# Patient Record
Sex: FEMALE | Race: WHITE | HISPANIC OR LATINO | Employment: FULL TIME | URBAN - METROPOLITAN AREA
[De-identification: names, ages, dates, MRNs, and addresses within clinical notes are randomized per-mention and may not be internally consistent; named-entity substitution may affect disease eponyms.]

---

## 2019-01-04 ENCOUNTER — APPOINTMENT (OUTPATIENT)
Dept: CT IMAGING | Facility: CLINIC | Age: 29
End: 2019-01-04
Payer: COMMERCIAL

## 2019-01-04 ENCOUNTER — HOSPITAL ENCOUNTER (OUTPATIENT)
Facility: CLINIC | Age: 29
Setting detail: OBSERVATION
Discharge: HOME | End: 2019-01-05
Attending: EMERGENCY MEDICINE | Admitting: SURGERY
Payer: COMMERCIAL

## 2019-01-04 DIAGNOSIS — R10.31 RLQ ABDOMINAL PAIN: ICD-10-CM

## 2019-01-04 DIAGNOSIS — K35.80 ACUTE APPENDICITIS, UNSPECIFIED ACUTE APPENDICITIS TYPE: ICD-10-CM

## 2019-01-04 DIAGNOSIS — K35.30 ACUTE APPENDICITIS WITH LOCALIZED PERITONITIS, WITHOUT PERFORATION, ABSCESS, OR GANGRENE: Primary | ICD-10-CM

## 2019-01-04 LAB
ALBUMIN SERPL-MCNC: 3.7 G/DL (ref 3.4–5)
ALBUMIN UR-MCNC: NEGATIVE MG/DL
ALP SERPL-CCNC: 136 U/L (ref 40–150)
ALT SERPL W P-5'-P-CCNC: 22 U/L (ref 0–50)
ANION GAP SERPL CALCULATED.3IONS-SCNC: 8 MMOL/L (ref 3–14)
APPEARANCE UR: CLEAR
AST SERPL W P-5'-P-CCNC: 22 U/L (ref 0–45)
BASOPHILS # BLD AUTO: 0 10E9/L (ref 0–0.2)
BASOPHILS NFR BLD AUTO: 0.1 %
BILIRUB SERPL-MCNC: 0.4 MG/DL (ref 0.2–1.3)
BILIRUB UR QL STRIP: NEGATIVE
BUN SERPL-MCNC: 12 MG/DL (ref 7–30)
CALCIUM SERPL-MCNC: 8.4 MG/DL (ref 8.5–10.1)
CHLORIDE SERPL-SCNC: 105 MMOL/L (ref 94–109)
CO2 SERPL-SCNC: 26 MMOL/L (ref 20–32)
COLOR UR AUTO: YELLOW
CREAT SERPL-MCNC: 0.62 MG/DL (ref 0.52–1.04)
DIFFERENTIAL METHOD BLD: ABNORMAL
EOSINOPHIL # BLD AUTO: 0.1 10E9/L (ref 0–0.7)
EOSINOPHIL NFR BLD AUTO: 0.3 %
ERYTHROCYTE [DISTWIDTH] IN BLOOD BY AUTOMATED COUNT: 12.7 % (ref 10–15)
GFR SERPL CREATININE-BSD FRML MDRD: >90 ML/MIN/{1.73_M2}
GLUCOSE SERPL-MCNC: 123 MG/DL (ref 70–99)
GLUCOSE UR STRIP-MCNC: NEGATIVE MG/DL
HCG UR QL: NEGATIVE
HCT VFR BLD AUTO: 37.2 % (ref 35–47)
HGB BLD-MCNC: 12.6 G/DL (ref 11.7–15.7)
HGB UR QL STRIP: ABNORMAL
IMM GRANULOCYTES # BLD: 0 10E9/L (ref 0–0.4)
IMM GRANULOCYTES NFR BLD: 0.2 %
KETONES UR STRIP-MCNC: 40 MG/DL
LEUKOCYTE ESTERASE UR QL STRIP: NEGATIVE
LIPASE SERPL-CCNC: 73 U/L (ref 73–393)
LYMPHOCYTES # BLD AUTO: 1 10E9/L (ref 0.8–5.3)
LYMPHOCYTES NFR BLD AUTO: 7.2 %
MCH RBC QN AUTO: 30.4 PG (ref 26.5–33)
MCHC RBC AUTO-ENTMCNC: 33.9 G/DL (ref 31.5–36.5)
MCV RBC AUTO: 90 FL (ref 78–100)
MONOCYTES # BLD AUTO: 0.7 10E9/L (ref 0–1.3)
MONOCYTES NFR BLD AUTO: 4.5 %
MUCOUS THREADS #/AREA URNS LPF: PRESENT /LPF
NEUTROPHILS # BLD AUTO: 12.7 10E9/L (ref 1.6–8.3)
NEUTROPHILS NFR BLD AUTO: 87.7 %
NITRATE UR QL: NEGATIVE
NRBC # BLD AUTO: 0 10*3/UL
NRBC BLD AUTO-RTO: 0 /100
PH UR STRIP: 5.5 PH (ref 5–7)
PLATELET # BLD AUTO: 253 10E9/L (ref 150–450)
POTASSIUM SERPL-SCNC: 3.4 MMOL/L (ref 3.4–5.3)
PROT SERPL-MCNC: 7.8 G/DL (ref 6.8–8.8)
RBC # BLD AUTO: 4.15 10E12/L (ref 3.8–5.2)
RBC #/AREA URNS AUTO: 8 /HPF (ref 0–2)
SODIUM SERPL-SCNC: 139 MMOL/L (ref 133–144)
SOURCE: ABNORMAL
SP GR UR STRIP: 1.02 (ref 1–1.03)
SQUAMOUS #/AREA URNS AUTO: <1 /HPF (ref 0–1)
UROBILINOGEN UR STRIP-MCNC: NORMAL MG/DL (ref 0–2)
WBC # BLD AUTO: 14.5 10E9/L (ref 4–11)
WBC #/AREA URNS AUTO: 1 /HPF (ref 0–5)

## 2019-01-04 PROCEDURE — 25000128 H RX IP 250 OP 636: Performed by: EMERGENCY MEDICINE

## 2019-01-04 PROCEDURE — 96375 TX/PRO/DX INJ NEW DRUG ADDON: CPT

## 2019-01-04 PROCEDURE — 25000125 ZZHC RX 250: Performed by: EMERGENCY MEDICINE

## 2019-01-04 PROCEDURE — 80053 COMPREHEN METABOLIC PANEL: CPT | Performed by: EMERGENCY MEDICINE

## 2019-01-04 PROCEDURE — 83690 ASSAY OF LIPASE: CPT | Performed by: EMERGENCY MEDICINE

## 2019-01-04 PROCEDURE — 81025 URINE PREGNANCY TEST: CPT | Performed by: EMERGENCY MEDICINE

## 2019-01-04 PROCEDURE — 85025 COMPLETE CBC W/AUTO DIFF WBC: CPT | Performed by: EMERGENCY MEDICINE

## 2019-01-04 PROCEDURE — 96361 HYDRATE IV INFUSION ADD-ON: CPT

## 2019-01-04 PROCEDURE — 81001 URINALYSIS AUTO W/SCOPE: CPT | Performed by: EMERGENCY MEDICINE

## 2019-01-04 PROCEDURE — 74177 CT ABD & PELVIS W/CONTRAST: CPT

## 2019-01-04 PROCEDURE — 99285 EMERGENCY DEPT VISIT HI MDM: CPT | Mod: 25

## 2019-01-04 PROCEDURE — 96374 THER/PROPH/DIAG INJ IV PUSH: CPT | Mod: 59

## 2019-01-04 RX ORDER — ONDANSETRON 2 MG/ML
4 INJECTION INTRAMUSCULAR; INTRAVENOUS ONCE
Status: COMPLETED | OUTPATIENT
Start: 2019-01-04 | End: 2019-01-04

## 2019-01-04 RX ORDER — MORPHINE SULFATE 4 MG/ML
4 INJECTION, SOLUTION INTRAMUSCULAR; INTRAVENOUS ONCE
Status: COMPLETED | OUTPATIENT
Start: 2019-01-04 | End: 2019-01-05

## 2019-01-04 RX ORDER — KETOROLAC TROMETHAMINE 15 MG/ML
15 INJECTION, SOLUTION INTRAMUSCULAR; INTRAVENOUS ONCE
Status: COMPLETED | OUTPATIENT
Start: 2019-01-04 | End: 2019-01-04

## 2019-01-04 RX ORDER — IOPAMIDOL 755 MG/ML
64 INJECTION, SOLUTION INTRAVASCULAR ONCE
Status: COMPLETED | OUTPATIENT
Start: 2019-01-04 | End: 2019-01-04

## 2019-01-04 RX ORDER — KETOROLAC TROMETHAMINE 30 MG/ML
30 INJECTION, SOLUTION INTRAMUSCULAR; INTRAVENOUS ONCE
Status: DISCONTINUED | OUTPATIENT
Start: 2019-01-04 | End: 2019-01-04

## 2019-01-04 RX ORDER — PIPERACILLIN SODIUM, TAZOBACTAM SODIUM 3; .375 G/15ML; G/15ML
3.38 INJECTION, POWDER, LYOPHILIZED, FOR SOLUTION INTRAVENOUS ONCE
Status: COMPLETED | OUTPATIENT
Start: 2019-01-04 | End: 2019-01-05

## 2019-01-04 RX ADMIN — KETOROLAC TROMETHAMINE 15 MG: 15 INJECTION, SOLUTION INTRAMUSCULAR; INTRAVENOUS at 22:46

## 2019-01-04 RX ADMIN — IOPAMIDOL 64 ML: 755 INJECTION, SOLUTION INTRAVENOUS at 23:12

## 2019-01-04 RX ADMIN — ONDANSETRON 4 MG: 2 INJECTION INTRAMUSCULAR; INTRAVENOUS at 20:50

## 2019-01-04 RX ADMIN — SODIUM CHLORIDE 60 ML: 9 INJECTION, SOLUTION INTRAVENOUS at 23:12

## 2019-01-04 RX ADMIN — SODIUM CHLORIDE 1000 ML: 9 INJECTION, SOLUTION INTRAVENOUS at 22:46

## 2019-01-04 ASSESSMENT — MIFFLIN-ST. JEOR: SCORE: 1265.2

## 2019-01-05 ENCOUNTER — ANESTHESIA (OUTPATIENT)
Dept: SURGERY | Facility: CLINIC | Age: 29
End: 2019-01-05
Payer: COMMERCIAL

## 2019-01-05 ENCOUNTER — ANESTHESIA EVENT (OUTPATIENT)
Dept: SURGERY | Facility: CLINIC | Age: 29
End: 2019-01-05
Payer: COMMERCIAL

## 2019-01-05 VITALS
SYSTOLIC BLOOD PRESSURE: 106 MMHG | HEART RATE: 62 BPM | TEMPERATURE: 97.7 F | RESPIRATION RATE: 19 BRPM | OXYGEN SATURATION: 97 % | BODY MASS INDEX: 22.5 KG/M2 | HEIGHT: 63 IN | WEIGHT: 127 LBS | DIASTOLIC BLOOD PRESSURE: 68 MMHG

## 2019-01-05 PROBLEM — K37 APPENDICITIS: Status: ACTIVE | Noted: 2019-01-05

## 2019-01-05 PROCEDURE — 25000128 H RX IP 250 OP 636: Performed by: SURGERY

## 2019-01-05 PROCEDURE — 40000170 ZZH STATISTIC PRE-PROCEDURE ASSESSMENT II: Performed by: SURGERY

## 2019-01-05 PROCEDURE — 99204 OFFICE O/P NEW MOD 45 MIN: CPT | Mod: 57 | Performed by: SURGERY

## 2019-01-05 PROCEDURE — 44970 LAPAROSCOPY APPENDECTOMY: CPT | Mod: AS | Performed by: PHYSICIAN ASSISTANT

## 2019-01-05 PROCEDURE — 88304 TISSUE EXAM BY PATHOLOGIST: CPT | Mod: 26 | Performed by: SURGERY

## 2019-01-05 PROCEDURE — 25800025 ZZH RX 258: Performed by: SURGERY

## 2019-01-05 PROCEDURE — 96376 TX/PRO/DX INJ SAME DRUG ADON: CPT

## 2019-01-05 PROCEDURE — 25000132 ZZH RX MED GY IP 250 OP 250 PS 637: Performed by: PHYSICIAN ASSISTANT

## 2019-01-05 PROCEDURE — 71000013 ZZH RECOVERY PHASE 1 LEVEL 1 EA ADDTL HR: Performed by: SURGERY

## 2019-01-05 PROCEDURE — 25000128 H RX IP 250 OP 636: Performed by: ANESTHESIOLOGY

## 2019-01-05 PROCEDURE — 25000125 ZZHC RX 250: Performed by: SURGERY

## 2019-01-05 PROCEDURE — 25000128 H RX IP 250 OP 636: Performed by: NURSE ANESTHETIST, CERTIFIED REGISTERED

## 2019-01-05 PROCEDURE — 25000566 ZZH SEVOFLURANE, EA 15 MIN: Performed by: SURGERY

## 2019-01-05 PROCEDURE — 37000009 ZZH ANESTHESIA TECHNICAL FEE, EACH ADDTL 15 MIN: Performed by: SURGERY

## 2019-01-05 PROCEDURE — 88304 TISSUE EXAM BY PATHOLOGIST: CPT | Performed by: SURGERY

## 2019-01-05 PROCEDURE — 71000012 ZZH RECOVERY PHASE 1 LEVEL 1 FIRST HR: Performed by: SURGERY

## 2019-01-05 PROCEDURE — G0378 HOSPITAL OBSERVATION PER HR: HCPCS

## 2019-01-05 PROCEDURE — 36000056 ZZH SURGERY LEVEL 3 1ST 30 MIN: Performed by: SURGERY

## 2019-01-05 PROCEDURE — 44970 LAPAROSCOPY APPENDECTOMY: CPT | Performed by: SURGERY

## 2019-01-05 PROCEDURE — 96375 TX/PRO/DX INJ NEW DRUG ADDON: CPT

## 2019-01-05 PROCEDURE — 25000128 H RX IP 250 OP 636: Performed by: EMERGENCY MEDICINE

## 2019-01-05 PROCEDURE — 36000058 ZZH SURGERY LEVEL 3 EA 15 ADDTL MIN: Performed by: SURGERY

## 2019-01-05 PROCEDURE — 27210794 ZZH OR GENERAL SUPPLY STERILE: Performed by: SURGERY

## 2019-01-05 PROCEDURE — 25000125 ZZHC RX 250: Performed by: NURSE ANESTHETIST, CERTIFIED REGISTERED

## 2019-01-05 PROCEDURE — 37000008 ZZH ANESTHESIA TECHNICAL FEE, 1ST 30 MIN: Performed by: SURGERY

## 2019-01-05 RX ORDER — ONDANSETRON 2 MG/ML
4 INJECTION INTRAMUSCULAR; INTRAVENOUS EVERY 30 MIN PRN
Status: DISCONTINUED | OUTPATIENT
Start: 2019-01-05 | End: 2019-01-05 | Stop reason: HOSPADM

## 2019-01-05 RX ORDER — LABETALOL HYDROCHLORIDE 5 MG/ML
10 INJECTION, SOLUTION INTRAVENOUS
Status: DISCONTINUED | OUTPATIENT
Start: 2019-01-05 | End: 2019-01-05 | Stop reason: HOSPADM

## 2019-01-05 RX ORDER — LIDOCAINE 40 MG/G
CREAM TOPICAL
Status: DISCONTINUED | OUTPATIENT
Start: 2019-01-05 | End: 2019-01-05 | Stop reason: HOSPADM

## 2019-01-05 RX ORDER — FENTANYL CITRATE 50 UG/ML
INJECTION, SOLUTION INTRAMUSCULAR; INTRAVENOUS PRN
Status: DISCONTINUED | OUTPATIENT
Start: 2019-01-05 | End: 2019-01-05

## 2019-01-05 RX ORDER — PIPERACILLIN SODIUM, TAZOBACTAM SODIUM 3; .375 G/15ML; G/15ML
3.38 INJECTION, POWDER, LYOPHILIZED, FOR SOLUTION INTRAVENOUS EVERY 6 HOURS
Status: DISCONTINUED | OUTPATIENT
Start: 2019-01-05 | End: 2019-01-05 | Stop reason: HOSPADM

## 2019-01-05 RX ORDER — HYDROMORPHONE HYDROCHLORIDE 1 MG/ML
.3-.5 INJECTION, SOLUTION INTRAMUSCULAR; INTRAVENOUS; SUBCUTANEOUS
Status: DISCONTINUED | OUTPATIENT
Start: 2019-01-05 | End: 2019-01-05 | Stop reason: HOSPADM

## 2019-01-05 RX ORDER — SODIUM CHLORIDE, SODIUM LACTATE, POTASSIUM CHLORIDE, CALCIUM CHLORIDE 600; 310; 30; 20 MG/100ML; MG/100ML; MG/100ML; MG/100ML
INJECTION, SOLUTION INTRAVENOUS CONTINUOUS
Status: DISCONTINUED | OUTPATIENT
Start: 2019-01-05 | End: 2019-01-05 | Stop reason: HOSPADM

## 2019-01-05 RX ORDER — HYDROCODONE BITARTRATE AND ACETAMINOPHEN 5; 325 MG/1; MG/1
1-2 TABLET ORAL EVERY 4 HOURS PRN
Qty: 25 TABLET | Refills: 0 | Status: SHIPPED | OUTPATIENT
Start: 2019-01-05 | End: 2019-01-08

## 2019-01-05 RX ORDER — FENTANYL CITRATE 50 UG/ML
25-50 INJECTION, SOLUTION INTRAMUSCULAR; INTRAVENOUS EVERY 5 MIN PRN
Status: DISCONTINUED | OUTPATIENT
Start: 2019-01-05 | End: 2019-01-05 | Stop reason: HOSPADM

## 2019-01-05 RX ORDER — AMOXICILLIN 250 MG
1-2 CAPSULE ORAL 2 TIMES DAILY PRN
Qty: 120 TABLET | Refills: 0 | COMMUNITY
Start: 2019-01-05 | End: 2019-02-04

## 2019-01-05 RX ORDER — ACETAMINOPHEN 325 MG/1
650 TABLET ORAL EVERY 4 HOURS PRN
Status: DISCONTINUED | OUTPATIENT
Start: 2019-01-05 | End: 2019-01-05 | Stop reason: HOSPADM

## 2019-01-05 RX ORDER — MEPERIDINE HYDROCHLORIDE 25 MG/ML
12.5 INJECTION INTRAMUSCULAR; INTRAVENOUS; SUBCUTANEOUS ONCE
Status: COMPLETED | OUTPATIENT
Start: 2019-01-05 | End: 2019-01-05

## 2019-01-05 RX ORDER — DEXAMETHASONE SODIUM PHOSPHATE 4 MG/ML
INJECTION, SOLUTION INTRA-ARTICULAR; INTRALESIONAL; INTRAMUSCULAR; INTRAVENOUS; SOFT TISSUE PRN
Status: DISCONTINUED | OUTPATIENT
Start: 2019-01-05 | End: 2019-01-05

## 2019-01-05 RX ORDER — PROPOFOL 10 MG/ML
INJECTION, EMULSION INTRAVENOUS PRN
Status: DISCONTINUED | OUTPATIENT
Start: 2019-01-05 | End: 2019-01-05

## 2019-01-05 RX ORDER — NALOXONE HYDROCHLORIDE 0.4 MG/ML
.1-.4 INJECTION, SOLUTION INTRAMUSCULAR; INTRAVENOUS; SUBCUTANEOUS
Status: DISCONTINUED | OUTPATIENT
Start: 2019-01-05 | End: 2019-01-05 | Stop reason: HOSPADM

## 2019-01-05 RX ORDER — MAGNESIUM HYDROXIDE 1200 MG/15ML
LIQUID ORAL PRN
Status: DISCONTINUED | OUTPATIENT
Start: 2019-01-05 | End: 2019-01-05 | Stop reason: HOSPADM

## 2019-01-05 RX ORDER — HYDROCODONE BITARTRATE AND ACETAMINOPHEN 5; 325 MG/1; MG/1
1-2 TABLET ORAL
Status: COMPLETED | OUTPATIENT
Start: 2019-01-05 | End: 2019-01-05

## 2019-01-05 RX ORDER — LIDOCAINE HYDROCHLORIDE 20 MG/ML
INJECTION, SOLUTION INFILTRATION; PERINEURAL PRN
Status: DISCONTINUED | OUTPATIENT
Start: 2019-01-05 | End: 2019-01-05

## 2019-01-05 RX ORDER — ACETAMINOPHEN 650 MG/1
650 SUPPOSITORY RECTAL EVERY 4 HOURS PRN
Status: DISCONTINUED | OUTPATIENT
Start: 2019-01-05 | End: 2019-01-05 | Stop reason: HOSPADM

## 2019-01-05 RX ORDER — ONDANSETRON 4 MG/1
4 TABLET, ORALLY DISINTEGRATING ORAL EVERY 30 MIN PRN
Status: DISCONTINUED | OUTPATIENT
Start: 2019-01-05 | End: 2019-01-05 | Stop reason: HOSPADM

## 2019-01-05 RX ORDER — ONDANSETRON 4 MG/1
4 TABLET, ORALLY DISINTEGRATING ORAL EVERY 6 HOURS PRN
Status: DISCONTINUED | OUTPATIENT
Start: 2019-01-05 | End: 2019-01-05 | Stop reason: HOSPADM

## 2019-01-05 RX ORDER — NEOSTIGMINE METHYLSULFATE 1 MG/ML
VIAL (ML) INJECTION PRN
Status: DISCONTINUED | OUTPATIENT
Start: 2019-01-05 | End: 2019-01-05

## 2019-01-05 RX ORDER — ONDANSETRON 2 MG/ML
4 INJECTION INTRAMUSCULAR; INTRAVENOUS EVERY 6 HOURS PRN
Status: DISCONTINUED | OUTPATIENT
Start: 2019-01-05 | End: 2019-01-05 | Stop reason: HOSPADM

## 2019-01-05 RX ORDER — PROPOFOL 10 MG/ML
INJECTION, EMULSION INTRAVENOUS CONTINUOUS PRN
Status: DISCONTINUED | OUTPATIENT
Start: 2019-01-05 | End: 2019-01-05

## 2019-01-05 RX ORDER — GLYCOPYRROLATE 0.2 MG/ML
INJECTION, SOLUTION INTRAMUSCULAR; INTRAVENOUS PRN
Status: DISCONTINUED | OUTPATIENT
Start: 2019-01-05 | End: 2019-01-05

## 2019-01-05 RX ORDER — HYDROMORPHONE HYDROCHLORIDE 1 MG/ML
.3-.5 INJECTION, SOLUTION INTRAMUSCULAR; INTRAVENOUS; SUBCUTANEOUS EVERY 5 MIN PRN
Status: DISCONTINUED | OUTPATIENT
Start: 2019-01-05 | End: 2019-01-05 | Stop reason: HOSPADM

## 2019-01-05 RX ADMIN — HYDROMORPHONE HYDROCHLORIDE 0.5 MG: 1 INJECTION, SOLUTION INTRAMUSCULAR; INTRAVENOUS; SUBCUTANEOUS at 02:14

## 2019-01-05 RX ADMIN — MEPERIDINE HYDROCHLORIDE 12.5 MG: 25 INJECTION INTRAMUSCULAR; INTRAVENOUS; SUBCUTANEOUS at 08:55

## 2019-01-05 RX ADMIN — HYDROCODONE BITARTRATE AND ACETAMINOPHEN 1 TABLET: 5; 325 TABLET ORAL at 09:40

## 2019-01-05 RX ADMIN — PHENYLEPHRINE HYDROCHLORIDE 100 MCG: 10 INJECTION, SOLUTION INTRAMUSCULAR; INTRAVENOUS; SUBCUTANEOUS at 07:46

## 2019-01-05 RX ADMIN — SODIUM CHLORIDE, POTASSIUM CHLORIDE, SODIUM LACTATE AND CALCIUM CHLORIDE: 600; 310; 30; 20 INJECTION, SOLUTION INTRAVENOUS at 07:58

## 2019-01-05 RX ADMIN — MORPHINE SULFATE 4 MG: 4 INJECTION INTRAVENOUS at 00:07

## 2019-01-05 RX ADMIN — PROPOFOL 100 MG: 10 INJECTION, EMULSION INTRAVENOUS at 07:30

## 2019-01-05 RX ADMIN — NEOSTIGMINE METHYLSULFATE 4 MG: 1 INJECTION, SOLUTION INTRAVENOUS at 08:07

## 2019-01-05 RX ADMIN — FENTANYL CITRATE 50 MCG: 50 INJECTION, SOLUTION INTRAMUSCULAR; INTRAVENOUS at 09:14

## 2019-01-05 RX ADMIN — PROPOFOL 30 MCG/KG/MIN: 10 INJECTION, EMULSION INTRAVENOUS at 07:38

## 2019-01-05 RX ADMIN — SODIUM CHLORIDE, POTASSIUM CHLORIDE, SODIUM LACTATE AND CALCIUM CHLORIDE: 600; 310; 30; 20 INJECTION, SOLUTION INTRAVENOUS at 07:27

## 2019-01-05 RX ADMIN — PHENYLEPHRINE HYDROCHLORIDE 100 MCG: 10 INJECTION, SOLUTION INTRAMUSCULAR; INTRAVENOUS; SUBCUTANEOUS at 07:43

## 2019-01-05 RX ADMIN — ONDANSETRON 4 MG: 2 INJECTION INTRAMUSCULAR; INTRAVENOUS at 07:42

## 2019-01-05 RX ADMIN — FENTANYL CITRATE 25 MCG: 50 INJECTION, SOLUTION INTRAMUSCULAR; INTRAVENOUS at 07:30

## 2019-01-05 RX ADMIN — FENTANYL CITRATE 50 MCG: 50 INJECTION, SOLUTION INTRAMUSCULAR; INTRAVENOUS at 07:53

## 2019-01-05 RX ADMIN — SUCCINYLCHOLINE CHLORIDE 60 MG: 20 INJECTION, SOLUTION INTRAMUSCULAR; INTRAVENOUS; PARENTERAL at 07:30

## 2019-01-05 RX ADMIN — DEXAMETHASONE SODIUM PHOSPHATE 4 MG: 4 INJECTION, SOLUTION INTRA-ARTICULAR; INTRALESIONAL; INTRAMUSCULAR; INTRAVENOUS; SOFT TISSUE at 07:37

## 2019-01-05 RX ADMIN — FENTANYL CITRATE 25 MCG: 50 INJECTION, SOLUTION INTRAMUSCULAR; INTRAVENOUS at 07:54

## 2019-01-05 RX ADMIN — PHENYLEPHRINE HYDROCHLORIDE 100 MCG: 10 INJECTION, SOLUTION INTRAMUSCULAR; INTRAVENOUS; SUBCUTANEOUS at 07:52

## 2019-01-05 RX ADMIN — ROCURONIUM BROMIDE 20 MG: 10 INJECTION INTRAVENOUS at 07:50

## 2019-01-05 RX ADMIN — MIDAZOLAM 2 MG: 1 INJECTION INTRAMUSCULAR; INTRAVENOUS at 07:26

## 2019-01-05 RX ADMIN — GLYCOPYRROLATE 0.8 MG: 0.2 INJECTION, SOLUTION INTRAMUSCULAR; INTRAVENOUS at 08:07

## 2019-01-05 RX ADMIN — PIPERACILLIN SODIUM,TAZOBACTAM SODIUM 3.38 G: 3; .375 INJECTION, POWDER, FOR SOLUTION INTRAVENOUS at 00:01

## 2019-01-05 RX ADMIN — LIDOCAINE HYDROCHLORIDE 60 MG: 20 INJECTION, SOLUTION INFILTRATION; PERINEURAL at 07:30

## 2019-01-05 RX ADMIN — PHENYLEPHRINE HYDROCHLORIDE 100 MCG: 10 INJECTION, SOLUTION INTRAMUSCULAR; INTRAVENOUS; SUBCUTANEOUS at 07:36

## 2019-01-05 RX ADMIN — PIPERACILLIN SODIUM,TAZOBACTAM SODIUM 3.38 G: 3; .375 INJECTION, POWDER, FOR SOLUTION INTRAVENOUS at 06:32

## 2019-01-05 SDOH — HEALTH STABILITY: MENTAL HEALTH: HOW OFTEN DO YOU HAVE A DRINK CONTAINING ALCOHOL?: NEVER

## 2019-01-05 ASSESSMENT — ENCOUNTER SYMPTOMS
HEMATURIA: 0
DIFFICULTY URINATING: 0
VOMITING: 0
DIAPHORESIS: 1
DIARRHEA: 0
ABDOMINAL PAIN: 1
DYSURIA: 0
BLOOD IN STOOL: 0
NAUSEA: 1
FREQUENCY: 0

## 2019-01-05 ASSESSMENT — MIFFLIN-ST. JEOR: SCORE: 1265.2

## 2019-01-05 NOTE — OR NURSING
here and at bedside.  Pt up in recliner.  Pt is breastfeed.  So she pumped and dumped in phase 2 as instructed by anesthesia.

## 2019-01-05 NOTE — ED NOTES
"[facility]  ED Nurse Handoff Report    ED Chief complaint: Abdominal Pain (generalized abdominal since 1500 with nausea. States she has not had a menstraul period since having child 4 months ago. )      ED Diagnosis:   Final diagnoses:   RLQ abdominal pain   Acute appendicitis, unspecified acute appendicitis type       Code Status: Full Code    Allergies: Not on File    Activity level - Baseline/Home:  Independent    Activity Level - Current:   Stand with Assist     Needed?: No    Isolation: Yes  Infection: Not Applicable  Bariatric?: No    Vital Signs:   Vitals:    01/04/19 2039 01/04/19 2237 01/04/19 2321 01/05/19 0000   BP: 117/71 105/74 94/62 107/64   Pulse: 118 107 99 97   Resp: 16      Temp: 98.6  F (37  C)      SpO2: 96% 97% 97% 97%   Weight: 57.6 kg (127 lb)      Height: 1.6 m (5' 3\")          Cardiac Rhythm: ,        Pain level: 0-10 Pain Scale: 4    Is this patient confused?: No   Does this patient have a guardian?  No         If yes, is there guardianship documents in the Epic \"Code/ACP\" activity?  N/A         Guardian Notified?  N/A  [place] - Suicide Severity Rating Scale Completed?  Yes  If yes, what color did the patient score?  White    Patient Report: Initial Complaint: abdominal pain  Focused Assessment: Vicky Mccormick is a 28 year old female who presents to the emergency department today for evaluation of abdominal pain. Patient states she had a sudden onset of generalized abdominal pain, weakness, and nausea. She endorses trying to take a nap, however, she states she woke up sweating with her abdominal pain more in the right lower quadrant. Additionally, the patient states her pain is exacerbated with walking and hitting bumps while she was driving here. She endorses that her son recently has gotten a cold with one episode of vomiting this morning. Patient denies vomiting, diarrhea, bloody stools, hematuria, dysuria, difficulty urinating, urinary frequency, history " of ovarian cysts, or history of kidney stones.      Tests Performed: labs, abdominal CT  Abnormal Results: see epic  Treatments provided: IV fluids, IV antibiotic, pain meds    Family Comments: no family is here but patient is updating SO by phone    OBS brochure/video discussed/provided to patient/family: Yes              Name of person given brochure if not patient: n/a              Relationship to patient: n/a    ED Medications:   Medications   piperacillin-tazobactam (ZOSYN) 3.375 g vial to attach to  mL bag (3.375 g Intravenous New Bag 1/5/19 0001)   ondansetron (ZOFRAN) injection 4 mg (4 mg Intravenous Given 1/4/19 2050)   0.9% sodium chloride BOLUS (1,000 mLs Intravenous New Bag 1/4/19 2246)   ketorolac (TORADOL) injection 15 mg (15 mg Intravenous Given 1/4/19 2246)   morphine (PF) injection 4 mg (4 mg Intravenous Given 1/5/19 0007)   Saline Flush - CT (60 mLs Intravenous Given 1/4/19 2312)   iopamidol (ISOVUE-370) solution 64 mL (64 mLs Intravenous Given 1/4/19 2312)       Drips infusing?:  No    For the majority of the shift this patient was Green.   Interventions performed were n/a.    Severe Sepsis OR Septic Shock Diagnosis Present: No    To be done/followed up on inpatient unit:  none    ED NURSE PHONE NUMBER: *[phone]

## 2019-01-05 NOTE — ANESTHESIA PREPROCEDURE EVALUATION
"Anesthesia Pre-Procedure Evaluation    Patient: Vicky Mccormick   MRN: [id number] : 1990          Preoperative Diagnosis: APPENDICITIS    Procedure(s):  LAPAROSCOPIC APPENDECTOMY    No past medical history on file.  No past surgical history on file.    Anesthesia Evaluation     .             ROS/MED HX    ENT/Pulmonary:      (-) sleep apnea   Neurologic:       Cardiovascular:         METS/Exercise Tolerance:     Hematologic:         Musculoskeletal:         GI/Hepatic:        (-) GERD   Renal/Genitourinary:         Endo:         Psychiatric:         Infectious Disease:         Malignancy:         Other:                          Physical Exam  Normal systems: cardiovascular, pulmonary and dental    Airway   Mallampati: II  TM distance: >3 FB  Neck ROM: full    Dental     Cardiovascular       Pulmonary             Lab Results   Component Value Date    WBC 14.5 (H) 2019    HGB 12.6 2019    HCT 37.2 2019     2019     2019    POTASSIUM 3.4 2019    CHLORIDE 105 2019    CO2 26 2019    BUN 12 2019    CR 0.62 2019     (H) 2019    OSEAS 8.4 (L) 2019    ALBUMIN 3.7 2019    PROTTOTAL 7.8 2019    ALT 22 2019    AST 22 2019    ALKPHOS 136 2019    BILITOTAL 0.4 2019    LIPASE 73 2019    HCG Negative 2019       Preop Vitals  BP Readings from Last 3 Encounters:   19 95/57    Pulse Readings from Last 3 Encounters:   19 69      Resp Readings from Last 3 Encounters:   19 16    SpO2 Readings from Last 3 Encounters:   19 98%      Temp Readings from Last 1 Encounters:   19 36.7  C (98  F) (Temporal)    Ht Readings from Last 1 Encounters:   19 1.6 m (5' 3\")      Wt Readings from Last 1 Encounters:   19 57.6 kg (127 lb)    Estimated body mass index is 22.5 kg/m  as calculated from the following:    Height as of this encounter: 1.6 m (5' 3\").    Weight as " of this encounter: 57.6 kg (127 lb).       Anesthesia Plan      History & Physical Review  History and physical reviewed and following examination; no interval change.    ASA Status:  1 .    NPO Status:  > 8 hours    Plan for General, RSI and ETT with Intravenous induction. Maintenance will be Balanced.    PONV prophylaxis:  Ondansetron (or other 5HT-3) and Dexamethasone or Solumedrol       Postoperative Care  Postoperative pain management:  IV analgesics.      Consents  Anesthetic plan, risks, benefits and alternatives discussed with:  Patient..                 NICKOLAS CLEMENTS MD

## 2019-01-05 NOTE — PROGRESS NOTES
RECEIVING UNIT ED HANDOFF REVIEW    ED Nurse Handoff Report was reviewed by: Kjersten Rice on January 5, 2019 at 1:10 AM

## 2019-01-05 NOTE — OR NURSING
UPDATED AND TOLD TO COME TO THE HOSPITAL FOR DISCHARGE.  PT COMFORTABLE, AND STATES SHE WISHES TO GO HOME FROM RECOVERY.

## 2019-01-05 NOTE — OP NOTE
General Surgery Operative Note    PREOPERATIVE DIAGNOSIS:  APPENDICITIS    POSTOPERATIVE DIAGNOSIS:  same    PROCEDURE:  LAPAROSCOPIC APPENDECTOMY    ANESTHESIA:  General.    PREOPERATIVE MEDICATIONS:  Zosyn IV.    SURGEON:  Alo Pandey M.D.    ASSISTANT:  Vicky Crawford PA-C, Physician assistant first assistant was necessary during the performance of this procedure for expertise in patient positioning, prepping, draping, trocar placement, camera management, retraction and exposure, and suctioning.    INDICATIONS:  Patient presented to ED where evaluation was completed. Signs and symptoms were consistent with Appendicitis. CT Abd/Pelvis was also performed and consistent with appendicitis. Procedure was thoroughly described, risks including but not limited to  Bleeding, infection, or visceral injury were outlined. She wished to proceed.    PROCEDURE:  After informed consent was obtained the patient was taken to the operating suite and uneventfully endotracheally intubated.  Abdomen was prepped and draped in a sterile fashion.  Surgeon initiated timeout was acknowledged.  A small skin incision was made in the infraumbilical position after infiltrating with local anesthetic through which a verees needle was passed. Intra-abdominal position was confirmed using the saline drop test. A carbon dioxide pneumoperitoneum was then established.  After adequate insufflation the Veress needle was removed and replaced with a 12 mm trocar.  Two other trocars were placed in the usual positions under laparoscopic visualization after the infiltration of local anesthetic.  Using 2 long graspers, we were able to identify the cecum and the appendix was identified near the ileocecal valve.  The appendix was inflamed and hyperemic but not ruptured.  I was able to grasp the appendix and elevate up toward the anterior abdominal wall.  Using a combination of sharp and blunt dissection, I was able to create a window between the appendix  and the mesoappendix near its base. I then fired a 45 mm blue load staple fire across the appendix at its base removing with it a cuff of cecum.  This appeared to be intact.  Following this, I fired a 45 mm white load across the mesoappendix, freeing the appendix from the cecum.  Appendix was placed in a specimen retrieval bag and removed from the abdomen. I again inspected the staple lines and these were all found to be intact and hemostatic.  I removed the umbilical port trocar and reapproximated the fascia with a figure-of-eight 0 Vicryl suture.  I then desufflated the abdomen  and the remaining trocars were removed.  The skin edges were reapproximated using 4-0 Vicryl and Steri-Strips.  Band-Aids were placed and the patient was awakened.  The patient was uneventfully extubated and taken to PACU in stable condition.  At the conclusion of the case, all lap and needle counts were correct.      ESTIMATED BLOOD LOSS:  5cc    Alo Pandey MD

## 2019-01-05 NOTE — ANESTHESIA CARE TRANSFER NOTE
Patient: Vicky Mccormick    Procedure(s):  LAPAROSCOPIC APPENDECTOMY    Diagnosis: APPENDICITIS  Diagnosis Additional Information: No value filed.    Anesthesia Type:   General, RSI, ETT     Note:  Airway :Face Mask  Patient transferred to:PACU  Comments: PAtient awake, extubated, and transferred to PACU. VS stable and report given to RN. Handoff Report: Identifed the Patient, Identified the Reponsible Provider, Reviewed the pertinent medical history, Discussed the surgical course, Reviewed Intra-OP anesthesia mangement and issues during anesthesia, Set expectations for post-procedure period and Allowed opportunity for questions and acknowledgement of understanding      Vitals: (Last set prior to Anesthesia Care Transfer)    CRNA VITALS  1/5/2019 0759 - 1/5/2019 0835      1/5/2019             Pulse:  95    SpO2:  100 %    Resp Rate (observed):  1  (Abnormal)                 Electronically Signed By: MEGA Bolaños CRNA  January 5, 2019  8:35 AM

## 2019-01-05 NOTE — CONSULTS
General Surgery Consultation    Vicky Mccormick MRN# [id number]   Age: 28 year old YOB: 1990     Date of Admission:  1/4/2019    Reason for consult: Appendicitis       Requesting physician: Dalia                Assessment and Plan:   Assessment:   Acute appendicitis      Plan:   Pathophysiology of acute appendicitis was thoroughly discussed.  Treatment options were also reviewed.  It is my recommendation that the patient undergo laparoscopic appendectomy.  The procedure was described in detail.  We reviewed the risks, benefits and outcomes.  Her questions were answered to her satisfaction and she consents to proceed.             Chief Complaint:   Right lower quadrant abdominal pain     History is obtained from the patient and electronic health record         History of Present Illness:   This patient is a 28 year old  female without a significant past medical history who presents with   .  Right lower quadrant abdominal pain.  She reports that her symptoms began yesterday as a generalized abdominal discomfort.  She felt that this may represent food poisoning.  Over the course of the day her pain became more severe and localized to the right lower quadrant.  This was accompanied with nausea but no vomiting.  She describes having fevers.  She had no other changes in her bowel habits.  She presented to the emergency department where she was evaluated.  She underwent a CT scan of the abdomen and pelvis which revealed a dilated appendix.  I was contacted for consultation.  I reviewed her images and given her clinical history feel that there is a significant likelihood at least 95% that she is suffering from acute appendicitis patient was therefore admitted and we discussed proceeding with surgery this morning.          Past Medical History:    has no past medical history on file.          Past Surgical History:     Past Surgical History:   Procedure Laterality Date     GYN SURGERY      D+C   X2            "Medications:     No current facility-administered medications on file prior to encounter.   No current outpatient medications on file prior to encounter.      Allergies:    Not on File         Social History:   She does not smoke, reports no significant alcohol use.  She is single with a significant other and a 4-month-old child.  She works as a phlebotomist.          Family History:   The patient has no family history of any bleeding, clotting or anesthesia problems.          Review of Systems:   Ten point Review of Systems is negative other than noted in the HPI          Physical Exam:   Gen:  This is a well developed, well nourished woman in no apparent distress.  Blood pressure 98/62, pulse 69, temperature 98  F (36.7  C), temperature source Temporal, resp. rate 16, height 1.6 m (5' 3\"), weight 57.6 kg (127 lb), SpO2 98 %, currently breastfeeding.  HEENT - Normocephalic, atraumatic, mucous membranes moist.  No scleral icterus.  Neck - supple without masses  Lungs - clear to ascultation.    Heart - Regular rate & rhythm without murmur  Abdomen:   soft, non-distended, tenderness noted in the right lower quadrant and no masses palpated, normal bowel sounds   Extremities - warm without edema  Neurologic - nonfocal          Data:   WBC -   WBC   Date Value Ref Range Status   01/04/2019 14.5 (H) 4.0 - 11.0 10e9/L Final   ], HgB - Hemoglobin   Date Value Ref Range Status   01/04/2019 12.6 11.7 - 15.7 g/dL Final   ]   Liver Function Studies -   Recent Labs   Lab Test 01/04/19  2043   PROTTOTAL 7.8   ALBUMIN 3.7   BILITOTAL 0.4   ALKPHOS 136   AST 22   ALT 22     CT scan of the abdomen:   Personally reviewed   Ultrasound of the abdomen:     Alo Pandey MD       "

## 2019-01-05 NOTE — DISCHARGE INSTRUCTIONS
Same Day Surgery Discharge Instructions for  Sedation and General Anesthesia       It's not unusual to feel dizzy, light-headed or faint for up to 24 hours after surgery or while taking pain medication.  If you have these symptoms: sit for a few minutes before standing and have someone assist you when you get up to walk or use the bathroom.      You should rest and relax for the next 24 hours. We recommend you make arrangements to have an adult stay with you for at least 24 hours after your discharge.  Avoid hazardous and strenuous activity.      DO NOT DRIVE any vehicle or operate mechanical equipment for 24 hours following the end of your surgery.  Even though you may feel normal, your reactions may be affected by the medication you have received.      Do not drink alcoholic beverages for 24 hours following surgery.       Slowly progress to your regular diet as you feel able. It's not unusual to feel nauseated and/or vomit after receiving anesthesia.  If you develop these symptoms, drink clear liquids (apple juice, ginger ale, broth, 7-up, etc. ) until you feel better.  If your nausea and vomiting persists for 24 hours, please notify your surgeon.        All narcotic pain medications, along with inactivity and anesthesia, can cause constipation. Drinking plenty of liquids and increasing fiber intake will help.      For any questions of a medical nature, call your surgeon.      Do not make important decisions for 24 hours.      If you had general anesthesia, you may have a sore throat for a couple of days related to the breathing tube used during surgery.  You may use Cepacol lozenges to help with this discomfort.  If it worsens or if you develop a fever, contact your surgeon.       If you feel your pain is not well managed with the pain medications prescribed by your surgeon, please contact your surgeon's office to let them know so they can address your concerns.     [facility]  Discharge Instructions: Post-Operative Laparoscopic Appendectomy     ACTIVITY    Expect to feel tired after your surgery.  This will gradually resolve.    Take frequent, short walks and increase your activity gradually.      Avoid strenuous physical activity or heavy lifting greater than 15-20 lbs. for 2-3 weeks.  You may climb stairs.    You may drive without restrictions when you are not using any prescription pain medication and comfortable in a car.    You may return to work/school when you are comfortable without any prescription pain medication.    WOUND CARE    You may remove your outer dressing or Band-Aids and shower 48 hours after the surgery.  Pat your incisions dry and leave open to air.  Re-apply dressing (Band-aid or gauze/tape) as needed for comfort or drainage.    You may have steri-strips (looks like white tape) on your incision.  You may peel off the steri-strip 2 weeks after surgery if they have not peeled off on their own.    Do not soak your incisions in a tub or pool for 2 weeks.     Do not apply any lotions, creams, or ointments to your incisions.    A ridge under incisions is normal and will gradually resolve.    DIET    Start with liquids, then gradually resume your regular diet as tolerated.  Avoid heavy, spicy, and greasy meals for 2-3 days.    Drink plenty of liquids to stay hydrated.     PAIN    Expect some tenderness and discomfort at the incision sites.  Use the prescribed pain medication at your discretion.  Expect gradual resolution of your pain over several days.    You may take ibuprofen with food (unless you have been told not to) instead of or in addition to your prescribed pain medication.  If you are taking Norco or Percocet, do not take any additional acetaminophen/APAP/Tylenol.    Do not drink alcohol or drive while you are taking pain medications.    You may apply ice to your incisions in 20 minute intervals as needed for the next 48 hours.  After that time,  consider switching to heat if you prefer.    EXPECTATIONS    Pain medications can cause constipation.  Limit use when possible.  Take over the counter stool softener/stimulant, such as Colace or Senna, 1-2 times a day with plenty of water.  You may take a mild over the counter laxative, such as Miralax or a suppository, as needed.  You may discontinue these medications once you are having regular bowel movements and/or are no longer taking your narcotic pain medication.      You may have shoulder or upper back discomfort due to the gas used in surgery.  This is temporary and should resolve in 48-72 hours.  Short, frequent walks may help with this.      FOLLOW UP    Our office will contact you in approximately 2 weeks to check on your progress and answer any questions you may have.  If you are doing well, you will not need to return for a follow up appointment.  If any concerns are identified over the phone, we will help you make an appointment to see a provider.    If you have not received a phone call, have any questions or concerns, or would like to be seen , please call us at [phone] and ask to speak with our nurse.  We are located at [address] [place] [address]    CALL OUR OFFICE IF YOU HAVE:     Chills or fever above 101.5 F.    Increased redness or drainage at your incisions.    Significant bleeding.    Pain not relieved by your pain medication or rest.    Increasing pain after the first 48 hours.    Any other concerns or questions.      PER ANESTHESIA:  SINCE YOU ARE BREASTFEEDING.  WE SUGGEST THAT YOU PUMP AND DUMP ATLEAST 1 FEEDING CYCLE.    **If you have questions or concerns about your procedure,  call Dr. Pandey at [phone]**

## 2019-01-05 NOTE — ED PROVIDER NOTES
"  History     Chief Complaint:  Abdominal pain    HPI   Vicky Mccormick is a 28 year old female who presents to the emergency department today for evaluation of abdominal pain. Patient states she had a sudden onset of generalized abdominal pain, weakness, and nausea. She endorses trying to take a nap, however, she states she woke up sweating with her abdominal pain more in the right lower quadrant. Additionally, the patient states her pain is exacerbated with walking and hitting bumps while she was driving here. She endorses that her son recently has gotten a cold with one episode of vomiting this morning. Patient denies vomiting, diarrhea, bloody stools, hematuria, dysuria, difficulty urinating, urinary frequency, history of ovarian cysts, or history of kidney stones.    Allergies:  No Known Drug Allergies    Medications:    ParaGard     Past Medical History:    Past medical history reviewed. No pertinent medical history.    Past Surgical History:    Dilation and curettage  Hysteroscopic resection of retained products of conception in [facility]    Family History:    Family history reviewed. No pertinent family history.    Social History:  The patient was accompanied to the ED by herself.  Smoking Status: Never Smoker  Smokeless Tobacco: Never Used  Alcohol Use: Negative  Drug Use: Negative  Marital Status:  Single     Review of Systems   Constitutional: Positive for diaphoresis.   Gastrointestinal: Positive for abdominal pain and nausea. Negative for blood in stool, diarrhea and vomiting.   Genitourinary: Negative for difficulty urinating, dysuria, frequency and hematuria.   All other systems reviewed and are negative.      Physical Exam     Patient Vitals for the past 24 hrs:   BP Temp Pulse Resp SpO2 Height Weight   01/05/19 0000 107/64 -- 97 -- 97 % -- --   01/04/19 2321 94/62 -- 99 -- 97 % -- --   01/04/19 2237 105/74 -- 107 -- 97 % -- --   01/04/19 2039 117/71 98.6  F (37  C) 118 16 96 % 1.6 m (5' 3\") 57.6 kg " (127 lb)       Physical Exam    Constitutional:  Looks uncomfortable. Appears well-developed and well-nourished. Cooperative.   HENT:   Head:    Atraumatic.   Mouth/Throat:   Oropharynx is without erythema or exudate and mucous membranes are tacky.   Eyes:    Conjunctivae normal and EOM are normal.      Pupils are equal, round, and reactive to light.   Neck:    Normal range of motion. Neck supple.   Cardiovascular:  Normal rate, regular rhythm, normal heart sounds and radial and dorsalis pedis pulses are 2+ and symmetric.    Pulmonary/Chest:  Effort normal and breath sounds normal.   Abdominal:   Mild periumbilical tenderness and some guarding with deep palpation in the right lower quadrant. Soft. Bowel sounds are normal.      No splenomegaly or hepatomegaly. No rebound.   Musculoskeletal:  Normal range of motion. No edema and no tenderness.   Neurological:  Alert. Normal strength. No cranial nerve deficit.   Skin:    Skin is warm and dry.   Psychiatric:   Normal mood and affect.     Emergency Department Course     Imaging:  Radiology findings were communicated with the patient who voiced understanding of the findings.    CT Abdomen Pelvis w Contrast  1. Questionable early appendicitis without evidence of perforation or  abscess formation.  2. Unusual orientation of an IUD.  Results discussed with Dr. Gómez in the [facility] ER at 11:27 p.m. on  1/4/2019.  MALINA GARCIA MD  Reading per radiology    Laboratory:  Laboratory findings were communicated with the patient who voiced understanding of the findings.    UA with microscopic: ketones 40(A), blood moderate(A), RBC 8(A), mucous present(A) o/w WNL  HCG qualitative: negative  Lipase: 73  CBC: WBC 14.5(H), HGB 12.6,   CMP: glucose 123(H) o/w WNL (Creatinine 0.62)    Interventions:  2050 Zofran 4mg IV  2246 NS 1000ml IV  2246 Toradol 15mg IV  0001 Zosyn 3.375g IV  0007 morphine 4mg IV    Emergency Department Course:    2043 IV was inserted and blood was drawn  for laboratory testing, results above.    2100 The patient provided a urine sample here in the emergency department. This was sent for laboratory testing, findings above.    2230 Nursing notes and vitals reviewed.    2240 I performed an exam of the patient as documented above.     2327 I spoke with Dr. Avila of the radiologist service regarding patient's CT findings.    2316 The patient was sent for a CT Abdomen Pelvis while in the emergency department, results above.     0003 Recheck and update.    0012 I spoke with Dr. Pandey of the General Surgery service from [facility] regarding patient's presentation, findings, and plan of care.    0123 I personally reviewed the lab and image results with the patient and answered all related questions prior to admission.    Impression & Plan      Medical Decision Making:  Vicky Mccormick presented with right lower quadrant abdominal pain. Patient had a sudden onset of right lower quadrant abdominal pain that is exacerbated with walking and hitting bumps while riding in the car. The CT scan confirms questionable early appendicitis.  There is no evidence of rupture or abscess at this time. Pain has been controlled with interventions in the Emergency Department.  Parenteral antibiotics have been administered in the Emergency Department, in anticipation of surgery. The case was discussed with Dr. Pandey and he will admit to the hospital with plan for surgery in the morning.    Diagnosis:    ICD-10-CM    1. RLQ abdominal pain R10.31    2. Acute appendicitis, unspecified acute appendicitis type K35.80      Disposition:   The patient is admitted into the care of Dr. Pandey.    Scribe Disclosure:  Tess BLACKWOOD, am serving as a scribe at 10:32 PM on 1/4/2019 to document services personally performed by Mat Gómez MD based on my observations and the provider's statements to me.     EMERGENCY DEPARTMENT       Mat Gómez MD  01/06/19 0854

## 2019-01-05 NOTE — ANESTHESIA POSTPROCEDURE EVALUATION
Patient: Vicky Mccormick    Procedure(s):  LAPAROSCOPIC APPENDECTOMY    Diagnosis:APPENDICITIS  Diagnosis Additional Information: No value filed.    Anesthesia Type:  General, RSI, ETT    Note:  Anesthesia Post Evaluation    Patient location during evaluation: PACU  Patient participation: Able to fully participate in evaluation  Level of consciousness: awake  Pain management: adequate  Airway patency: patent  Cardiovascular status: acceptable  Respiratory status: acceptable  Hydration status: acceptable  PONV: none     Anesthetic complications: None          Last vitals:  Vitals:    01/05/19 0900 01/05/19 0910 01/05/19 0915   BP: 108/69 103/67 103/67   Pulse: 60 58    Resp: 16 16 11   Temp: 36.1  C (97  F) 36.2  C (97.2  F) 36.2  C (97.2  F)   SpO2: 100% 98% 99%         Electronically Signed By: NICKOLAS CLEMENTS MD  January 5, 2019  9:23 AM

## 2019-01-05 NOTE — PLAN OF CARE
Pt arrived to the floor shortly after 0100. Transferred with A-1. A&O. VSS with soft pressures. Pain controlled with IV dilauid. NPO for planned surgery later this morning. Patient is breastfeeding, has own pump.

## 2019-01-07 NOTE — DISCHARGE SUMMARY
Surgery Discharge Summary    Vicky Mccormick MRN# [id number]   YOB: 1990 Age: 28 year old     Date of Admission:  1/4/2019  Date of Discharge:  1/5/2019 11:33 AM  Admitting Physician:  Alo Pandey MD  Discharging Service:  [facility] General Surgery   Primary Provider: Chikis Morris    Discharge Diagnosis:   Principle Diagnosis:  RLQ abdominal pain [R10.31]  Acute appendicitis, unspecified acute appendicitis type [K35.80]      Brief HPI: Vicky Mccormick is a 28 year old year-old female who presented to the emergency department with a 2 day history of sudden and progressive abdominal pain. Over the past day the pain became more severe and localized to her right lower quadrant. Pain was associated with nausea, no vomiting. Patient endorsed subjective fevers. CT abdomen/pelvis revealed a dilated appendix and labs revealed a leukocytosis of 14.5. After pre-op screening, discussing the risks, benefits, and possible complications, an informed consent was obtained to proceed with a laparoscopic appendectomy.      Hospital Course: Vicky Mccormick underwent a laparoscopic appendectomy without complications. Intra-op findings were consistent with grossly nonperforated acute appendicitis. Please see op note for further details. Post-op she was transferred to the PACU On POD #0, she was initiated on a diet and oral pain medications which she tolerated well. Her hospital course remained uncomplicated and she recovered as anticipated. On day of discharge, she was tolerating an oral diet, had good pain control on oral medications, was ambulating independently and remained afebrile thus medically appropriate for discharge. She will follow-up with us in two weeks via phone or in clinic and was advised to call with any questions or concerns.   Of note, the patient's CT scan in the ED revealed unusual orientation of her IUD. Per the ED provider's note this was reviewed with the patient and questions  "were answered prior to admission.    Inpatient Consultations: No consultations were requested during this admission    Procedures:   Laparoscopic appendectomy    Labs/Imaging (See Chart Review):  CT ABDOMEN PELVIS W CONTRAST  1/4/2019 11:16 PM      HISTORY: See the Clinical Information for Interpreting Provider;  abdominal pain, tenderness right lower quadrant, nausea, leukocytosis.     TECHNIQUE: 64 mL Isovue-370. CT images of the abdomen and pelvis  following nonionic intravenous contrast. Radiation dose for this scan  was reduced using automated exposure control, adjustment of the mA  and/or kV according to patient size, or iterative reconstruction  technique.     COMPARISON: None.     FINDINGS: The liver, gallbladder, spleen, pancreas, adrenal glands,  and kidneys appear normal. There is no free air or ascites. No  abnormal bowel distention.     The appendix is mildly dilated, measuring 8 mm (series 3, image 64).  No definite surrounding inflammation. No abdominal or retroperitoneal  lymphadenopathy.     An IUD is present but appears abnormally positioned with the limbs of  the IUD oriented in an inferior-superior orientation rather than  lateral. No pelvic adenopathy or free fluid.                                                                      IMPRESSION:  1. Questionable early appendicitis without evidence of perforation or  abscess formation.  2. Unusual orientation of an IUD.    Disposition:   Discharged to home     Discharge Condition  Discharge condition: Stable   Discharge vitals: Blood pressure 106/68, pulse 62, temperature 97.7  F (36.5  C), resp. rate 19, height 1.6 m (5' 3\"), weight 57.6 kg (127 lb), SpO2 97 %, currently breastfeeding.     Discharge Medications:   Discharge Medication List as of 1/5/2019  9:42 AM      START taking these medications    Details   HYDROcodone-acetaminophen (NORCO) 5-325 MG tablet Take 1-2 tablets by mouth every 4 hours as needed for moderate to severe pain, Disp-25 " tablet, R-0, Local Print      senna-docusate (SENOKOT-S/PERICOLACE) 8.6-50 MG tablet Take 1-2 tablets by mouth 2 times daily as needed for constipation (While on oral opioids.), Disp-120 tablet, R-0, OTC             Discharge Instructions:   See AVS    After Care Instructions     No lifting       No lifting over 20 lbs and no strenuous physical activity for 2 weeks                   Vicky Crawford PA-C   [facility]  [phone]

## 2019-01-08 LAB — COPATH REPORT: NORMAL

## 2019-01-16 ENCOUNTER — TELEPHONE (OUTPATIENT)
Dept: SURGERY | Facility: CLINIC | Age: 29
End: 2019-01-16

## 2019-01-16 NOTE — TELEPHONE ENCOUNTER
[facility]  Post op call note  January 16, 2019       Vicky Mccormick was called for an update regarding her recovery. She underwent laparoscopic appendectomy with Dr. Pandey on 1/5/19. There was no answer and a message was left encouraging her to call back with any questions or to provide an update. I reviewed her surgical pathology (below) but did not leave this information on her voicemail.    SPECIMEN(S):   Appendix     FINAL DIAGNOSIS:   Appendix, appendectomy   - Acute appendicitis         Vicky Crawford PA-C  [facility]  [phone]

## 2019-01-24 ENCOUNTER — OFFICE VISIT (OUTPATIENT)
Dept: SURGERY | Facility: CLINIC | Age: 29
End: 2019-01-24
Payer: COMMERCIAL

## 2019-01-24 VITALS
SYSTOLIC BLOOD PRESSURE: 100 MMHG | HEART RATE: 56 BPM | DIASTOLIC BLOOD PRESSURE: 50 MMHG | OXYGEN SATURATION: 97 % | BODY MASS INDEX: 22.5 KG/M2 | WEIGHT: 127 LBS

## 2019-01-24 DIAGNOSIS — Z09 FOLLOW-UP EXAMINATION FOLLOWING SURGERY: Primary | ICD-10-CM

## 2019-01-24 PROCEDURE — 99024 POSTOP FOLLOW-UP VISIT: CPT | Performed by: SURGERY

## 2019-01-24 RX ORDER — PRENATAL VIT,CAL 76/IRON/FOLIC 29 MG-1 MG
TABLET ORAL
Refills: 9 | COMMUNITY
Start: 2018-08-28

## 2019-01-24 NOTE — PROGRESS NOTES
Patient is a 28-year-old female who presents in follow-up after recent uncomplicated laparoscopic appendectomy.  Surgery was performed on January 5.  She is concerned about her ongoing healing.  She still has mild diffuse abdominal pain that seems mostly centered around the umbilicus as well as right lower quadrant.  She has had no fevers or chills.  She reports no nausea or vomiting.  She has some degree of chronic bowel dysmotility.  She also reports some degree of fatigue.    On examination: Her incisions are well-healed.  Her abdomen is soft and nondistended.  She does have some mild diffuse discomfort to even light palpation.  There are no masses palpated.    Overall I think she is recovering fine.  There really are no signs or symptoms to suggest intra-abdominal abscess or other surgical complication.  This was all discussed with her.  I believe that over the course the next week or 2 she should hopefully be improving.  We discussed the signs and symptoms to watch out for including fevers or chills.  Increasing abdominal discomfort or bloating.  She expressed understanding of this.  And can follow-up on an as-needed basis.    Please route or send letter to:  Primary Care Provider (PCP)

## 2019-04-19 ENCOUNTER — APPOINTMENT (OUTPATIENT)
Dept: ULTRASOUND IMAGING | Facility: CLINIC | Age: 29
End: 2019-04-19
Attending: EMERGENCY MEDICINE
Payer: COMMERCIAL

## 2019-04-19 ENCOUNTER — HOSPITAL ENCOUNTER (EMERGENCY)
Facility: CLINIC | Age: 29
Discharge: HOME | End: 2019-04-19
Attending: EMERGENCY MEDICINE | Admitting: EMERGENCY MEDICINE
Payer: COMMERCIAL

## 2019-04-19 VITALS
BODY MASS INDEX: 21.17 KG/M2 | HEART RATE: 64 BPM | RESPIRATION RATE: 16 BRPM | HEIGHT: 64 IN | WEIGHT: 124 LBS | TEMPERATURE: 97.9 F | SYSTOLIC BLOOD PRESSURE: 112 MMHG | OXYGEN SATURATION: 98 % | DIASTOLIC BLOOD PRESSURE: 70 MMHG

## 2019-04-19 DIAGNOSIS — R10.31 RLQ ABDOMINAL PAIN: ICD-10-CM

## 2019-04-19 LAB
ALBUMIN UR-MCNC: 10 MG/DL
ANION GAP SERPL CALCULATED.3IONS-SCNC: 6 MMOL/L (ref 3–14)
APPEARANCE UR: CLEAR
BASOPHILS # BLD AUTO: 0 10E9/L (ref 0–0.2)
BASOPHILS NFR BLD AUTO: 0.4 %
BILIRUB UR QL STRIP: NEGATIVE
BUN SERPL-MCNC: 8 MG/DL (ref 7–30)
CALCIUM SERPL-MCNC: 8.6 MG/DL (ref 8.5–10.1)
CHLORIDE SERPL-SCNC: 108 MMOL/L (ref 94–109)
CO2 SERPL-SCNC: 27 MMOL/L (ref 20–32)
COLOR UR AUTO: YELLOW
CREAT SERPL-MCNC: 0.59 MG/DL (ref 0.52–1.04)
DIFFERENTIAL METHOD BLD: NORMAL
EOSINOPHIL # BLD AUTO: 0.2 10E9/L (ref 0–0.7)
EOSINOPHIL NFR BLD AUTO: 2.2 %
ERYTHROCYTE [DISTWIDTH] IN BLOOD BY AUTOMATED COUNT: 13 % (ref 10–15)
GFR SERPL CREATININE-BSD FRML MDRD: >90 ML/MIN/{1.73_M2}
GLUCOSE SERPL-MCNC: 94 MG/DL (ref 70–99)
GLUCOSE UR STRIP-MCNC: NEGATIVE MG/DL
HCG UR QL: NEGATIVE
HCT VFR BLD AUTO: 36.4 % (ref 35–47)
HGB BLD-MCNC: 12.2 G/DL (ref 11.7–15.7)
HGB UR QL STRIP: NEGATIVE
IMM GRANULOCYTES # BLD: 0 10E9/L (ref 0–0.4)
IMM GRANULOCYTES NFR BLD: 0.2 %
KETONES UR STRIP-MCNC: 5 MG/DL
LEUKOCYTE ESTERASE UR QL STRIP: NEGATIVE
LYMPHOCYTES # BLD AUTO: 2.5 10E9/L (ref 0.8–5.3)
LYMPHOCYTES NFR BLD AUTO: 27.2 %
MCH RBC QN AUTO: 30.3 PG (ref 26.5–33)
MCHC RBC AUTO-ENTMCNC: 33.5 G/DL (ref 31.5–36.5)
MCV RBC AUTO: 91 FL (ref 78–100)
MONOCYTES # BLD AUTO: 0.5 10E9/L (ref 0–1.3)
MONOCYTES NFR BLD AUTO: 5.5 %
MUCOUS THREADS #/AREA URNS LPF: PRESENT /LPF
NEUTROPHILS # BLD AUTO: 6 10E9/L (ref 1.6–8.3)
NEUTROPHILS NFR BLD AUTO: 64.5 %
NITRATE UR QL: NEGATIVE
NRBC # BLD AUTO: 0 10*3/UL
NRBC BLD AUTO-RTO: 0 /100
PH UR STRIP: 6.5 PH (ref 5–7)
PLATELET # BLD AUTO: 261 10E9/L (ref 150–450)
POTASSIUM SERPL-SCNC: 3.3 MMOL/L (ref 3.4–5.3)
RBC # BLD AUTO: 4.02 10E12/L (ref 3.8–5.2)
RBC #/AREA URNS AUTO: 6 /HPF (ref 0–2)
SODIUM SERPL-SCNC: 141 MMOL/L (ref 133–144)
SOURCE: ABNORMAL
SP GR UR STRIP: 1.03 (ref 1–1.03)
SQUAMOUS #/AREA URNS AUTO: 2 /HPF (ref 0–1)
UROBILINOGEN UR STRIP-MCNC: 2 MG/DL (ref 0–2)
WBC # BLD AUTO: 9.3 10E9/L (ref 4–11)
WBC #/AREA URNS AUTO: <1 /HPF (ref 0–5)

## 2019-04-19 PROCEDURE — 93976 VASCULAR STUDY: CPT

## 2019-04-19 PROCEDURE — 81001 URINALYSIS AUTO W/SCOPE: CPT | Performed by: EMERGENCY MEDICINE

## 2019-04-19 PROCEDURE — 96360 HYDRATION IV INFUSION INIT: CPT

## 2019-04-19 PROCEDURE — 99284 EMERGENCY DEPT VISIT MOD MDM: CPT | Mod: 25

## 2019-04-19 PROCEDURE — 81025 URINE PREGNANCY TEST: CPT | Performed by: EMERGENCY MEDICINE

## 2019-04-19 PROCEDURE — 25000128 H RX IP 250 OP 636: Performed by: EMERGENCY MEDICINE

## 2019-04-19 PROCEDURE — 80048 BASIC METABOLIC PNL TOTAL CA: CPT | Performed by: EMERGENCY MEDICINE

## 2019-04-19 PROCEDURE — 85025 COMPLETE CBC W/AUTO DIFF WBC: CPT | Performed by: EMERGENCY MEDICINE

## 2019-04-19 RX ORDER — SODIUM CHLORIDE 9 MG/ML
1000 INJECTION, SOLUTION INTRAVENOUS CONTINUOUS
Status: DISCONTINUED | OUTPATIENT
Start: 2019-04-19 | End: 2019-04-19 | Stop reason: HOSPADM

## 2019-04-19 RX ADMIN — SODIUM CHLORIDE 1000 ML: 9 INJECTION, SOLUTION INTRAVENOUS at 15:35

## 2019-04-19 ASSESSMENT — MIFFLIN-ST. JEOR: SCORE: 1267.46

## 2019-04-19 NOTE — ED PROVIDER NOTES
"  History     Chief Complaint:  Abdominal Pain    HPI:   The history is provided by the patient.      Vicky Mccormick is a 28 year old female s/p appendectomy 3 months ago who presents with abdominal pain. The patient states that around 10 AM she had sudden onset of right upper quadrant abdominal pain that feels similar to the pain she felt prior to her appendectomy. She states that she is currently breastfeeding and not having regular periods with this.  Her daughter is currently 8 months old.  The patient states the pain exacerbates with walking. She denies abnormal vaginal bleeding or discharge. She denies fevers, chills, emesis, or diarrhea. She denies history of ovarian cyst.     Allergies:  No known drug allergies     Medications:    Copper IUD in place    Past Medical History:    The patient does not have any past pertinent medical history.     Past Surgical History:    Appendectomy  D&C x2    Family History:    History reviewed. No pertinent family history.      Social History:  [facility]   Marital Status:  Single  Smoking status: current every day smoker  Alcohol use: No      Review of Systems   Constitutional: Negative for chills and fever.   Gastrointestinal: Positive for abdominal pain. Negative for blood in stool, diarrhea and vomiting.   Genitourinary: Negative for dysuria, hematuria, vaginal bleeding and vaginal discharge.   All other systems reviewed and are negative.      Physical Exam     Patient Vitals for the past 24 hrs:   BP Temp Temp src Pulse Resp SpO2 Height Weight   04/19/19 1411 112/70 97.9  F (36.6  C) Oral 64 16 98 % 1.626 m (5' 4\") 56.2 kg (124 lb)        Physical Exam    Gen: Pleasant, appears stated age.    Eye:   Pupils are equal, round, and reactive.     Sclera non-injected.    ENT:   Moist mucus membranes.     Normal tongue.    Oropharynx without lesions.    Cardiac:     Normal rate and regular rhythm.    No murmurs, gallops, or rubs.    Pulmonary:     Clear to " auscultation bilaterally.    No wheezes, rales, or rhonchi.    Abdomen:     Normal active bowel sounds.     Abdomen is soft and non-distended, with mild RLQ and suprapubic tenderness.   No rebound or guarding.    Musculoskeletal:     Normal movement of all extremities without evidence for deficit.    Extremities:    No edema.    Skin:   Warm and dry.    Neurologic:    Non-focal exam without asymmetric weakness or numbness.    Normal tone    Psychiatric:     Normal affect with appropriate interaction with examiner.     Emergency Department Course     Imaging:  Radiographic findings were communicated with the patient who voiced understanding of the findings.    US Pelvis Cmplt w Transvag & Doppler LmtPel Duplex  Impression:   1. IUD is positioned with the arms in a craniocaudal orientation with  the arms projecting into the myometrium. This is similar to the prior  CT scan.  2. There are parametrial varicosities which may be associated with  pelvic venous congestion syndrome.  As read by Radiology.     Per Dr. Torres - normal sized ovaries; arterial and venous waveforms normal bilaterally.    Laboratory:  CBC: WNL (WBC 9.3, HGB 12.2, )   BMP: potassium 3.3, o/w WNL (Creatinine 0.59)  UA with microscopic: urine ketone 5, protein albumin 10, RBC/HPF 6, squamous epithelial/HPF 2, mucous urine present  HCG qualitative: Negative.     Interventions:  1535: NS 1L IV Bolus      Emergency Department Course:  Past medical records, nursing notes, and vitals reviewed.  1430: I performed an exam of the patient and obtained history, as documented above.    IV started and blood drawn for laboratory testing. Results are as above.    The patient was sent for US imaging while in the emergency department, findings above.     1702: I rechecked the patient. Explained findings to the patient.  Using shared decision making, we discussed that I did not find an etiology for symptoms today.  Given time constraints (she has to   her daughter), she would like to defer additional work up, including CT scan.  Reasonable given no peritonitis, normal vitals, unremarkable labs.    I rechecked the patient. Findings and plan explained to the Patient. Patient discharged home with instructions regarding supportive care, medications, and reasons to return. The importance of close follow-up was reviewed.       Impression & Plan      Medical Decision Making:  Vicky Mccormick is a 28 year old female status post uncomplicated appendectomy who presents today with RLQ abdominal pain.  The differential diagnosis is broad and includes:  Ovarian torsion, kidney stone, pyelonephritis, TOA, PID, ectopic pregnancy, amongst others.  Based on clinical exam, laboratory testing, and imaging, no significant etiology was found.  IUD was noted to be turned about 90 degrees from typical, similar to with previous CT in 1/19, but given she has been asymptomatic in the interim, I doubt this as a cause of her symptoms.  The patient does have a small number of RBC's in the urine.  We discussed that symptoms could be related to a kidney stone.  As noted above, she does not wish to pursue additional work up at this time.  She seems fairly comfortable, and clearly capable of making informed decisions.   The exact etiology of the pain is not clear at this time.  The patient understands that the disease process could be early, therefore, s/he should return for prompt re-examination by a physician (ED if necessary) in 8-12 hours if symptoms are persistent, and sooner for worsening symptoms.      Diagnosis:    ICD-10-CM    1. RLQ abdominal pain R10.31        Disposition:  discharged to home    Discharge Medications: There are no discharge medications for this visit.    I, Megan Beh, am serving as a scribe at 2:30 PM on 4/19/2019 to document services personally performed by Marla Schwarz MD based on my observations and the provider's statements to me.      Megan Beh  4/19/2019     EMERGENCY DEPARTMENT       Marla Schwarz MD  04/19/19 9693       Marla Schwarz MD  04/20/19 0727

## 2019-04-19 NOTE — ED AVS SNAPSHOT
Emergency Department  [address] [facility] [address]  Phone:  [phone]  Fax:  [phone]                                    Vicky Mccormick   MRN: [id number]    Department:   Emergency Department   Date of Visit:  4/19/2019           After Visit Summary Signature Page    I have received my discharge instructions, and my questions have been answered. I have discussed any challenges I see with this plan with the nurse or doctor.    ..........................................................................................................................................  Patient/Patient Representative Signature      ..........................................................................................................................................  Patient Representative Print Name and Relationship to Patient    ..................................................               ................................................  Date                                   Time    ..........................................................................................................................................  Reviewed by Signature/Title    ...................................................              ..............................................  Date                                               Time          22EPIC Rev 08/18

## 2019-04-20 ASSESSMENT — ENCOUNTER SYMPTOMS
HEMATURIA: 0
VOMITING: 0
FEVER: 0
ABDOMINAL PAIN: 1
BLOOD IN STOOL: 0
CHILLS: 0
DYSURIA: 0
DIARRHEA: 0

## 2022-05-06 ENCOUNTER — APPOINTMENT (OUTPATIENT)
Dept: GENERAL RADIOLOGY | Facility: CLINIC | Age: 32
End: 2022-05-06
Attending: EMERGENCY MEDICINE
Payer: COMMERCIAL

## 2022-05-06 ENCOUNTER — HOSPITAL ENCOUNTER (EMERGENCY)
Facility: CLINIC | Age: 32
Discharge: HOME | End: 2022-05-06
Attending: EMERGENCY MEDICINE | Admitting: EMERGENCY MEDICINE
Payer: COMMERCIAL

## 2022-05-06 VITALS
WEIGHT: 130 LBS | RESPIRATION RATE: 18 BRPM | HEIGHT: 62 IN | OXYGEN SATURATION: 97 % | DIASTOLIC BLOOD PRESSURE: 58 MMHG | BODY MASS INDEX: 23.92 KG/M2 | TEMPERATURE: 98.5 F | SYSTOLIC BLOOD PRESSURE: 100 MMHG | HEART RATE: 67 BPM

## 2022-05-06 DIAGNOSIS — R07.9 CHEST PAIN, UNSPECIFIED TYPE: ICD-10-CM

## 2022-05-06 DIAGNOSIS — T50.905A ADVERSE EFFECT OF DRUG, INITIAL ENCOUNTER: ICD-10-CM

## 2022-05-06 DIAGNOSIS — R06.02 SHORTNESS OF BREATH: ICD-10-CM

## 2022-05-06 LAB
ANION GAP SERPL CALCULATED.3IONS-SCNC: 5 MMOL/L (ref 3–14)
ATRIAL RATE - MUSE: 73 BPM
BASOPHILS # BLD AUTO: 0 10E3/UL (ref 0–0.2)
BASOPHILS NFR BLD AUTO: 0 %
BUN SERPL-MCNC: 15 MG/DL (ref 7–30)
CALCIUM SERPL-MCNC: 8.4 MG/DL (ref 8.5–10.1)
CHLORIDE BLD-SCNC: 106 MMOL/L (ref 94–109)
CO2 SERPL-SCNC: 28 MMOL/L (ref 20–32)
CREAT SERPL-MCNC: 0.8 MG/DL (ref 0.52–1.04)
D DIMER PPP FEU-MCNC: <0.27 UG/ML FEU (ref 0–0.5)
DIASTOLIC BLOOD PRESSURE - MUSE: NORMAL MMHG
EOSINOPHIL # BLD AUTO: 0.1 10E3/UL (ref 0–0.7)
EOSINOPHIL NFR BLD AUTO: 1 %
ERYTHROCYTE [DISTWIDTH] IN BLOOD BY AUTOMATED COUNT: 12.1 % (ref 10–15)
GFR SERPL CREATININE-BSD FRML MDRD: >90 ML/MIN/1.73M2
GLUCOSE BLD-MCNC: 99 MG/DL (ref 70–99)
HCT VFR BLD AUTO: 36.9 % (ref 35–47)
HGB BLD-MCNC: 12.1 G/DL (ref 11.7–15.7)
HOLD SPECIMEN: NORMAL
IMM GRANULOCYTES # BLD: 0 10E3/UL
IMM GRANULOCYTES NFR BLD: 0 %
INTERPRETATION ECG - MUSE: NORMAL
LYMPHOCYTES # BLD AUTO: 1.7 10E3/UL (ref 0.8–5.3)
LYMPHOCYTES NFR BLD AUTO: 26 %
MCH RBC QN AUTO: 30.3 PG (ref 26.5–33)
MCHC RBC AUTO-ENTMCNC: 32.8 G/DL (ref 31.5–36.5)
MCV RBC AUTO: 93 FL (ref 78–100)
MONOCYTES # BLD AUTO: 0.4 10E3/UL (ref 0–1.3)
MONOCYTES NFR BLD AUTO: 7 %
NEUTROPHILS # BLD AUTO: 4.4 10E3/UL (ref 1.6–8.3)
NEUTROPHILS NFR BLD AUTO: 66 %
NRBC # BLD AUTO: 0 10E3/UL
NRBC BLD AUTO-RTO: 0 /100
P AXIS - MUSE: 64 DEGREES
PLATELET # BLD AUTO: 259 10E3/UL (ref 150–450)
POTASSIUM BLD-SCNC: 3.5 MMOL/L (ref 3.4–5.3)
PR INTERVAL - MUSE: 128 MS
QRS DURATION - MUSE: 92 MS
QT - MUSE: 400 MS
QTC - MUSE: 440 MS
R AXIS - MUSE: 42 DEGREES
RBC # BLD AUTO: 3.99 10E6/UL (ref 3.8–5.2)
SODIUM SERPL-SCNC: 139 MMOL/L (ref 133–144)
SYSTOLIC BLOOD PRESSURE - MUSE: NORMAL MMHG
T AXIS - MUSE: 41 DEGREES
TROPONIN I SERPL HS-MCNC: 13 NG/L
VENTRICULAR RATE- MUSE: 73 BPM
WBC # BLD AUTO: 6.6 10E3/UL (ref 4–11)

## 2022-05-06 PROCEDURE — 99285 EMERGENCY DEPT VISIT HI MDM: CPT | Mod: 25 | Performed by: EMERGENCY MEDICINE

## 2022-05-06 PROCEDURE — 84484 ASSAY OF TROPONIN QUANT: CPT | Performed by: EMERGENCY MEDICINE

## 2022-05-06 PROCEDURE — 71046 X-RAY EXAM CHEST 2 VIEWS: CPT

## 2022-05-06 PROCEDURE — 85014 HEMATOCRIT: CPT | Performed by: EMERGENCY MEDICINE

## 2022-05-06 PROCEDURE — 258N000003 HC RX IP 258 OP 636: Performed by: EMERGENCY MEDICINE

## 2022-05-06 PROCEDURE — 36415 COLL VENOUS BLD VENIPUNCTURE: CPT | Performed by: EMERGENCY MEDICINE

## 2022-05-06 PROCEDURE — 96361 HYDRATE IV INFUSION ADD-ON: CPT | Performed by: EMERGENCY MEDICINE

## 2022-05-06 PROCEDURE — 93005 ELECTROCARDIOGRAM TRACING: CPT | Performed by: EMERGENCY MEDICINE

## 2022-05-06 PROCEDURE — 85379 FIBRIN DEGRADATION QUANT: CPT | Performed by: EMERGENCY MEDICINE

## 2022-05-06 PROCEDURE — 80048 BASIC METABOLIC PNL TOTAL CA: CPT | Performed by: EMERGENCY MEDICINE

## 2022-05-06 PROCEDURE — 250N000013 HC RX MED GY IP 250 OP 250 PS 637: Performed by: EMERGENCY MEDICINE

## 2022-05-06 PROCEDURE — 96360 HYDRATION IV INFUSION INIT: CPT | Performed by: EMERGENCY MEDICINE

## 2022-05-06 RX ORDER — DIPHENHYDRAMINE HCL 25 MG
25 CAPSULE ORAL ONCE
Status: COMPLETED | OUTPATIENT
Start: 2022-05-06 | End: 2022-05-06

## 2022-05-06 RX ADMIN — SODIUM CHLORIDE 1000 ML: 9 INJECTION, SOLUTION INTRAVENOUS at 01:47

## 2022-05-06 RX ADMIN — DIPHENHYDRAMINE HYDROCHLORIDE 25 MG: 25 CAPSULE ORAL at 01:40

## 2022-05-06 ASSESSMENT — ENCOUNTER SYMPTOMS
SINUS PRESSURE: 1
ROS SKIN COMMENTS: (+) ITCHING
CHEST TIGHTNESS: 1
COUGH: 1
SHORTNESS OF BREATH: 1
TROUBLE SWALLOWING: 0

## 2022-05-06 NOTE — DISCHARGE INSTRUCTIONS
Return for recurrence of symptoms.  Certainly for any throat closing sensation, shortness of breath, rash.  Follow-up with your primary doctor before taking the next dose of medication.

## 2022-05-06 NOTE — ED PROVIDER NOTES
"  History   Chief Complaint:  Allergic Reaction       The history is provided by the patient.      Vicky Mccormick is a 31 year old female with history of latent tuberculosis infection who presents with an allergic reaction. The patient was diagnosed with latent tuberculosis infection about 3 weeks ago and placed on a once-weekly antibiotic regimen. Since then, the patient reports irregularities after each dose such as high fever and headache (week 1), feeling sick (week 2), and most recently symptoms of an allergic reaction. She notes that she took her weekly dose this evening around 2030 and began experiencing itchiness, sinus pressure, shortness of breath, cough, and chest tightness within 30 minutes. Her symptoms made her anxious, prompting presentation to the ED. She denies trouble swallowing.    Review of Systems   HENT: Positive for sinus pressure. Negative for trouble swallowing.    Respiratory: Positive for cough, chest tightness and shortness of breath.    Skin:        (+) itching     Allergies:  No known drug allergies.    Medications:  Rifampin-Isoniazid  Paraguard    Past Medical History:     Latent tuberculosis infection      Past Surgical History:    Dilation and curettage x2  Laparoscopic appendectomy   Unspecified dental extraction    Social History:  The patient presents to the ED alone.    Physical Exam     Patient Vitals for the past 24 hrs:   BP Temp Temp src Pulse Resp SpO2 Height Weight   05/06/22 0421 100/58 -- -- 67 18 97 % -- --   05/06/22 0358 -- -- -- -- 16 96 % -- --   05/06/22 0307 98/64 -- -- 63 16 96 % -- --   05/06/22 0200 108/68 -- -- 59 -- 97 % -- --   05/06/22 0100 113/72 98.5  F (36.9  C) Temporal 63 18 99 % 1.575 m (5' 2\") 59 kg (130 lb)       Physical Exam  General: Patient is alert and anxious and tearful  HEENT: Head atraumatic    Eyes: pupils equal and reactive. Conjunctiva clear   Nares: patent   Oropharynx: no lesions, uvula midline, no palatal draping, normal voice, no " trismus  Neck: Supple without lymphadenopathy, no meningismus  Chest: Heart regular rate and rhythm.   Lungs: Equal clear to auscultation with no wheeze or rales  Abdomen: Soft, non tender, nondistended, normal bowel sounds  Back: No costovertebral angle tenderness, no midline C, T or L spine tenderness  Neuro: Grossly nonfocal, normal speech, strength equal bilaterally, CN 2-12 intact  Extremities: No deformities, equal radial and DP pulses. No clubbing, cyanosis.  No edema  Skin: Warm and dry with no rash.       Emergency Department Course     ECG  ECG obtained at 0106, ECG read at 0109  Normal sinus rhythm with sinus arrhythmia. Low voltage QRS. Incomplete right bundle branch block. Cannot rule out anterior infarct, age undetermined.   Rate 73 bpm. KS interval 128 ms. QRS duration 92 ms. QT/QTc 400/440 ms. P-R-T axes 64 42 41.     Imaging:  XR Chest 2 Views   Final Result   IMPRESSION: Negative chest.        Report per radiology    Laboratory:  Labs Ordered and Resulted from Time of ED Arrival to Time of ED Departure   BASIC METABOLIC PANEL - Abnormal       Result Value    Sodium 139      Potassium 3.5      Chloride 106      Carbon Dioxide (CO2) 28      Anion Gap 5      Urea Nitrogen 15      Creatinine 0.80      Calcium 8.4 (*)     Glucose 99      GFR Estimate >90     TROPONIN I - Normal    Troponin I High Sensitivity 13     D DIMER QUANTITATIVE - Normal    D-Dimer Quantitative <0.27     CBC WITH PLATELETS AND DIFFERENTIAL    WBC Count 6.6      RBC Count 3.99      Hemoglobin 12.1      Hematocrit 36.9      MCV 93      MCH 30.3      MCHC 32.8      RDW 12.1      Platelet Count 259      % Neutrophils 66      % Lymphocytes 26      % Monocytes 7      % Eosinophils 1      % Basophils 0      % Immature Granulocytes 0      NRBCs per 100 WBC 0      Absolute Neutrophils 4.4      Absolute Lymphocytes 1.7      Absolute Monocytes 0.4      Absolute Eosinophils 0.1      Absolute Basophils 0.0      Absolute Immature Granulocytes  0.0      Absolute NRBCs 0.0        Emergency Department Course:         Reviewed:  I reviewed nursing notes, vitals, past medical history and Care Everywhere    Assessments:  0128 I obtained history and examined the patient as noted above.   0413 I rechecked the patient and explained findings.     Interventions:  0140 Benadryl 25 mg PO  0147 NS Bolus 1 L IV    Disposition:  The patient was discharged to home.     Impression & Plan     Medical Decision Making:  Patient is a 31-year-old female presents the emergency department with chest pain, shortness of breath, itching that started when she went to lay down to sleep about 30 minutes after taking her weekly medications for latent TB.  On arrival here she appears quite anxious.  She has no urticaria or significant rash.  She is handling secretions well and has no obvious difficulty swallowing.  She has a normal voice.  She has no wheezes heard.  Work-up was undertaken as noted above.  No evidence of acute coronary syndrome, pneumonia, pneumothorax, PE.  She felt improved following Benadryl and fluids.  Discussed with patient this may have been a reaction to her medications but it could have been anxiety and panic as well.  I asked her to discuss the symptoms with her primary doctor prior to taking the next dose next week.  No evidence of anaphylaxis.  Patient feels comfortable with plan for discharge.  Return precautions the emergency department reviewed    Diagnosis:    ICD-10-CM    1. Adverse effect of drug, initial encounter  T50.905A    2. Chest pain, unspecified type  R07.9    3. Shortness of breath  R06.02        Scribe Disclosure:  Augustin BLACKWOOD, am serving as a scribe at 1:28 AM on 5/6/2022 to document services personally performed by Yennifer Elizondo MD based on my observations and the provider's statements to me.          Yennifer Elizondo MD  05/06/22 1596

## 2022-05-06 NOTE — ED TRIAGE NOTES
Is taking medication for latent TB and took it around 2230 and around 2300 began feeling chest tightness with cough and SOB and feeling itchy all over.     Triage Assessment     Row Name 05/06/22 0107       Triage Assessment (Adult)    Airway WDL WDL       Respiratory WDL    Respiratory WDL X;cough    Cough Frequency frequent       Skin Circulation/Temperature WDL    Skin Circulation/Temperature WDL X  itchy all over       Cardiac WDL    Cardiac WDL X;chest pain       Chest Pain Assessment    Chest Pain Location midsternal    Precipitating Factors at rest    Associated Signs/Symptoms dyspnea       Peripheral/Neurovascular WDL    Peripheral Neurovascular WDL WDL       Cognitive/Neuro/Behavioral WDL    Cognitive/Neuro/Behavioral WDL WDL

## 2022-05-16 ENCOUNTER — HOSPITAL ENCOUNTER (EMERGENCY)
Facility: CLINIC | Age: 32
Discharge: HOME | End: 2022-05-16
Attending: PHYSICIAN ASSISTANT | Admitting: PHYSICIAN ASSISTANT
Payer: COMMERCIAL

## 2022-05-16 VITALS
TEMPERATURE: 99.1 F | HEART RATE: 62 BPM | OXYGEN SATURATION: 98 % | SYSTOLIC BLOOD PRESSURE: 106 MMHG | DIASTOLIC BLOOD PRESSURE: 60 MMHG | RESPIRATION RATE: 16 BRPM

## 2022-05-16 DIAGNOSIS — T78.40XA ALLERGIC REACTION, INITIAL ENCOUNTER: ICD-10-CM

## 2022-05-16 PROCEDURE — 250N000013 HC RX MED GY IP 250 OP 250 PS 637: Performed by: PHYSICIAN ASSISTANT

## 2022-05-16 PROCEDURE — 250N000009 HC RX 250: Performed by: PHYSICIAN ASSISTANT

## 2022-05-16 PROCEDURE — 99285 EMERGENCY DEPT VISIT HI MDM: CPT

## 2022-05-16 RX ORDER — DIPHENHYDRAMINE HCL 25 MG
50 CAPSULE ORAL ONCE
Status: COMPLETED | OUTPATIENT
Start: 2022-05-16 | End: 2022-05-16

## 2022-05-16 RX ORDER — EPINEPHRINE 0.3 MG/.3ML
0.3 INJECTION SUBCUTANEOUS ONCE
Status: DISCONTINUED | OUTPATIENT
Start: 2022-05-16 | End: 2022-05-16

## 2022-05-16 RX ORDER — DEXAMETHASONE SODIUM PHOSPHATE 4 MG/ML
10 INJECTION, SOLUTION INTRA-ARTICULAR; INTRALESIONAL; INTRAMUSCULAR; INTRAVENOUS; SOFT TISSUE ONCE
Status: DISCONTINUED | OUTPATIENT
Start: 2022-05-16 | End: 2022-05-16

## 2022-05-16 RX ORDER — DEXAMETHASONE SODIUM PHOSPHATE 10 MG/ML
10 INJECTION, SOLUTION INTRAMUSCULAR; INTRAVENOUS ONCE
Status: COMPLETED | OUTPATIENT
Start: 2022-05-16 | End: 2022-05-16

## 2022-05-16 RX ORDER — EPINEPHRINE 0.3 MG/.3ML
0.3 INJECTION SUBCUTANEOUS
Qty: 2 EACH | Refills: 0 | Status: SHIPPED | OUTPATIENT
Start: 2022-05-16 | End: 2022-05-16

## 2022-05-16 RX ORDER — FAMOTIDINE 20 MG/1
20 TABLET, FILM COATED ORAL ONCE
Status: COMPLETED | OUTPATIENT
Start: 2022-05-16 | End: 2022-05-16

## 2022-05-16 RX ORDER — EPINEPHRINE 0.3 MG/.3ML
0.3 INJECTION SUBCUTANEOUS
Qty: 2 EACH | Refills: 0 | Status: SHIPPED | OUTPATIENT
Start: 2022-05-16

## 2022-05-16 RX ADMIN — DEXAMETHASONE SODIUM PHOSPHATE 10 MG: 10 INJECTION, SOLUTION INTRAMUSCULAR; INTRAVENOUS at 18:46

## 2022-05-16 RX ADMIN — FAMOTIDINE 20 MG: 20 TABLET ORAL at 18:43

## 2022-05-16 RX ADMIN — EPINEPHRINE 0.3 MG: 1 INJECTION INTRAMUSCULAR; INTRAVENOUS; SUBCUTANEOUS at 18:43

## 2022-05-16 RX ADMIN — DIPHENHYDRAMINE HYDROCHLORIDE 50 MG: 25 CAPSULE ORAL at 18:43

## 2022-05-16 ASSESSMENT — ENCOUNTER SYMPTOMS
SORE THROAT: 1
FACIAL SWELLING: 1
SHORTNESS OF BREATH: 1
TROUBLE SWALLOWING: 0
CHEST TIGHTNESS: 1

## 2022-05-16 NOTE — ED TRIAGE NOTES
Patient was in the ED for an allergic reaction on last Thursday. She was started on a new medication for latent TB and now has some hives.      Triage Assessment     Row Name 05/16/22 3733       Triage Assessment (Adult)    Airway WDL WDL       Respiratory WDL    Respiratory WDL WDL       Skin Circulation/Temperature WDL    Skin Circulation/Temperature WDL WDL       Cardiac WDL    Cardiac WDL WDL       Peripheral/Neurovascular WDL    Peripheral Neurovascular WDL WDL       Cognitive/Neuro/Behavioral WDL    Cognitive/Neuro/Behavioral WDL WDL

## 2022-05-16 NOTE — ED PROVIDER NOTES
"  History   Chief Complaint:  Allergic Reaction     The history is provided by the patient.      Vicky Mccormick is a 31 year old female with history of latent TB who presents with allergic reaction. The patient reports that at 1700 this evening she developed blurred vision, chest tightness, wheezing and rash after taking a new medication (Rifadin) at 1630. The patient describes her mouth as \"feeling funny\" and that her throat feels sore and swollen. She notes the rash is located on her upper chest, back and bilateral hands. The patient confirms having a burning/itching sensation in her hands. She also stated both her chest tightness and shortness of breath have resolved. The patient reports that this felt like her prior allergic reaction is not as bad as previous reactions (see note below). She has not taken any medication for the reaction today.     Per chart review, the patient was seen at this hospital on 05/06/22 for the same chief complaint, which was improved with Benadryl and a bolus of normal saline. After this visit, the patient was switched from Rifapentine to Rifadin for her latent TB. She took her first dose of Rifadin today, as noted above.     Review of Systems   HENT: Positive for facial swelling (intraoral swelling) and sore throat. Negative for trouble swallowing.    Eyes: Positive for visual disturbance.   Respiratory: Positive for chest tightness (resolved) and shortness of breath (resolved).    Skin: Positive for rash.   All other systems reviewed and are negative.    Allergies:  No known drug allergies.    Medications:  Rifapentine  RIFADIN  isoniazid    Past Medical History:     Positive QuantiFERON-TB Gold test  TB lung, latent    Past Surgical History:    D&C x2  Appendectomy    Social History:  Presents alone    Physical Exam     Patient Vitals for the past 24 hrs:   BP Temp Pulse Resp SpO2   05/16/22 1900 106/69 -- 65 16 98 %   05/16/22 1819 111/63 99.1  F (37.3  C) 74 20 97 % "       Physical Exam  General: Awake, alert, non-toxic.  Head:  Scalp is NC/AT  Eyes:  Conjunctiva normal, PERRL  ENT:  The external nose and ears are normal.     Oropharynx clear.  No swelling of uvula tongue lips or posterior oropharynx.    Neck:  Normal range of motion without rigidity.  CV:  Regular rate and rhythm    No pathologic murmur, rubs, or gallops.  Resp:  Breath sounds are clear bilaterally    Non-labored, no retractions or accessory muscle use  Abdomen: Abdomen is soft, no distension, no tenderness, no masses.  MS:  No lower extremity edema or asymmetric calf swelling.  Skin:  Warm and dry.  Scattered urticarial rash.  Neuro: Alert and oriented.  GCS 15 No gross motor deficits.  No facial asymmetry.  Psych:  Awake. Alert. Normal affect. Appropriate interactions.    Emergency Department Course     Reviewed:  I reviewed nursing notes, vitals, past medical history and Care Everywhere    Assessments:  1820 I obtained history and examined the patient as noted above.   1900 I rechecked the patient and explained findings.   2004 I rechecked the patient and explained findings. She feels much better.  Preparing for discharge.    Interventions:  1843 PEPCID 20 mg PO  1843 ADRENALIN 0.3 mg IV  1845 DECADRON 10 mg IV  1846 DECADRON 10 mg PO    Disposition:  The patient was discharged to home.     Impression & Plan   Medical Decision Making:  The patient presents for signs and symptoms consistent with an allergic reaction.  Based on the severity of presentation, epinephrine was indicated.  Following the administration of epinephrine as well as the above listed medication, the patient was monitored in the emergency department to assure no rebound symptoms.  At the time of discharge, the patient does not have signs of airway compromise and is hemodynamically stable.  Instructions for the use of benadryl and/or zyrtec, H2 blocker, and prednisone were reviewed.  Return precautions including oral swelling, difficulties  breathing, chest pain, syncope were given.  Patient will follow up in approximately 1 week for failure to improve. Epi pen given.     Diagnosis:    ICD-10-CM    1. Allergic reaction, initial encounter  T78.40XA        Discharge Medications:  New Prescriptions    EPINEPHRINE (ANY BX GENERIC EQUIV) 0.3 MG/0.3ML INJECTION 2-PACK    Inject 0.3 mLs (0.3 mg) into the muscle once as needed for anaphylaxis       Scribe Disclosure:  Christopher BLACKWOOD & Jay Sommer, am serving as a scribe at 6:20 PM on 5/16/2022 to document services personally performed by Carlito Robert PA-C based on my observations and the provider's statements to me.            Carlito Robert PA-C  05/16/22 2018

## 2023-07-18 PROCEDURE — 99284 EMERGENCY DEPT VISIT MOD MDM: CPT

## 2023-07-19 ENCOUNTER — HOSPITAL ENCOUNTER (EMERGENCY)
Facility: CLINIC | Age: 33
Discharge: HOME | End: 2023-07-19
Attending: EMERGENCY MEDICINE | Admitting: EMERGENCY MEDICINE
Payer: COMMERCIAL

## 2023-07-19 VITALS
BODY MASS INDEX: 23.92 KG/M2 | OXYGEN SATURATION: 99 % | HEIGHT: 63 IN | RESPIRATION RATE: 18 BRPM | DIASTOLIC BLOOD PRESSURE: 80 MMHG | WEIGHT: 135 LBS | HEART RATE: 70 BPM | TEMPERATURE: 98 F | SYSTOLIC BLOOD PRESSURE: 115 MMHG
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
## 2023-07-19 PROCEDURE — 90375 RABIES IG IM/SC: CPT | Mod: JZ | Performed by: EMERGENCY MEDICINE

## 2023-07-19 PROCEDURE — 250N000011 HC RX IP 250 OP 636: Mod: JZ | Performed by: EMERGENCY MEDICINE

## 2023-07-19 PROCEDURE — 90471 IMMUNIZATION ADMIN: CPT | Performed by: EMERGENCY MEDICINE

## 2023-07-19 PROCEDURE — 90675 RABIES VACCINE IM: CPT | Performed by: EMERGENCY MEDICINE

## 2023-07-19 PROCEDURE — 250N000013 HC RX MED GY IP 250 OP 250 PS 637: Performed by: EMERGENCY MEDICINE

## 2023-07-19 RX ORDER — ONDANSETRON 4 MG/1
4 TABLET, ORALLY DISINTEGRATING ORAL ONCE
Status: DISCONTINUED | OUTPATIENT
Start: 2023-07-19 | End: 2023-07-19 | Stop reason: HOSPADM

## 2023-07-19 RX ORDER — IBUPROFEN 600 MG/1
600 TABLET, FILM COATED ORAL ONCE
Status: COMPLETED | OUTPATIENT
Start: 2023-07-19 | End: 2023-07-19

## 2023-07-19 RX ADMIN — AMOXICILLIN AND CLAVULANATE POTASSIUM 1 TABLET: 875; 125 TABLET, FILM COATED ORAL at 03:58

## 2023-07-19 RX ADMIN — RABIES IMMUNE GLOBULIN (HUMAN) 1230 UNITS: 300 INJECTION, SOLUTION INFILTRATION; INTRAMUSCULAR at 04:53

## 2023-07-19 RX ADMIN — RABIES VACCINE 1 ML: KIT at 04:38

## 2023-07-19 RX ADMIN — IBUPROFEN 600 MG: 600 TABLET ORAL at 04:00

## 2023-07-19 ASSESSMENT — ACTIVITIES OF DAILY LIVING (ADL)
ADLS_ACUITY_SCORE: 35
ADLS_ACUITY_SCORE: 35

## 2023-07-19 NOTE — ED PROVIDER NOTES
"  History     Chief Complaint:  Dog Bite       HPI   Vicky Mccormick is a 32 year old female who presents for evaluation of a dog bite. The patient reports that she was bit in the back of her left thigh by her neighbor's dog around 2130 today. She believes that the dog was trying to protect its owner, but it is not trained and bit her. Vicky expresses doubt that the dog is vaccinated for rabies. The patient's last Tdap was in 2018. The patient is opting to receive shots for rabies rather than call animal control and quarantine her neighbor's dog.      Independent Historian:   None - Patient Only    Review of External Notes:   None       Medications:    Diflucan  Macrobid    Past Medical History:    Appendicitis  Positive QuantiFERON-TB Gold test  Radial styloid tenosynovitis of right hand  UTI    Past Surgical History:    D&C x2  Laparoscopic appendectomy    Physical Exam     Patient Vitals for the past 24 hrs:   BP Temp Temp src Pulse Resp SpO2 Height Weight   07/18/23 2343 108/74 98  F (36.7  C) -- 70 16 99 % 1.6 m (5' 3\") 61.2 kg (135 lb)   07/18/23 2303 121/83 98.2  F (36.8  C) Oral 93 -- 98 % 1.575 m (5' 2\") 61.2 kg (135 lb)        Physical Exam   Constitutional:  Patient is oriented to person, place, and time. They appear well-developed and well-nourished.  HENT:   Mouth/Throat:   Oropharynx is clear and moist.   Eyes:    Conjunctivae normal and EOM are normal. Pupils are equal, round, and reactive to light.   Neck:    Normal range of motion.   Cardiovascular: Normal rate, regular rhythm and normal heart sounds.  Exam reveals no gallop and no friction rub.  No murmur heard.  Pulmonary/Chest:  Effort normal and breath sounds normal. Patient has no wheezes. Patient has no rales.   Musculoskeletal:  Normal range of motion. Patient exhibits no edema.   Neurological:   Patient is alert and oriented to person, place, and time. Patient has normal strength. No cranial nerve deficit or sensory deficit. GCS " 15  Skin:    Patient has palm sized bruise on left posterior thigh with superficial abrasion. No laceration or evidence of foreign bodies.  Psychiatric:   Patient has a normal mood and affect. Patient's behavior is normal. Judgment and thought content normal.         Emergency Department Course     Emergency Department Course & Assessments:    Interventions:  Medications   ondansetron (ZOFRAN ODT) ODT tab 4 mg (has no administration in time range)   rabies immune globulin 300 units/mL (HYPERAB) injection 1,230 Units (1,230 Units Infiltration $Given 7/19/23 7483)   rabies vaccine, PCEC (chick embryo derived) (RABAVERT) injection 1 mL (1 mL Intramuscular $Given 7/19/23 0434)   amoxicillin-clavulanate (AUGMENTIN) 875-125 MG per tablet 1 tablet (1 tablet Oral $Given 7/19/23 0358)   ibuprofen (ADVIL/MOTRIN) tablet 600 mg (600 mg Oral $Given 7/19/23 0400)        Assessments:  0259 I obtained history and examined the patient as noted above    Independent Interpretation (X-rays, CTs, rhythm strip):  None    Consultations/Discussion of Management or Tests:  None        Social Determinants of Health affecting care:   None    Disposition:  The patient was discharged to home.     Impression & Plan      Medical Decision Making:  Vicky Mccormcik is a 32 year old female who presents for evaluation of a dog bite to the left posterior thigh.  The wound was superficial with some bruising.  There is no laceration.  There is no evidence of foreign body.  The workup here in the ED shows no signs of compartment syndrome, significant lacerations, tendon or bone injury. No signs of foreign body or dog teeth in wound. The dog is known, but the patient does not think it is vaccinated and does not want to do quarantine, she opted for rabies shots rather than observation of the dog for signs of rabies.I did place an order under treatment plans for the rabies series but she will also check with her primary care doctor's office to see if they  have it there.  The wounds were scrubbed and washed out with high pressure irrigation.  Will start Augmentin and have them observe for signs of infection (pain, redness, warmth, red streaks, etc).        Diagnosis:    ICD-10-CM    1. Dog bite, initial encounter  W54.0XXA            Discharge Medications:  New Prescriptions    AMOXICILLIN-CLAVULANATE (AUGMENTIN) 875-125 MG TABLET    Take 1 tablet by mouth 2 times daily for 7 days          Scribe Disclosure:  Stevenson BLACKWOOD, am serving as a scribe at 3:04 AM on 7/19/2023 to document services personally performed by Terrie Mckeon MD based on my observations and the provider's statements to me.   7/19/2023   Terrie Mckeon MD Bochert, Michelle Ann, MD  07/19/23 0524

## 2023-07-19 NOTE — ED TRIAGE NOTES
Was bit in the back of left thigh by neighbors dog.  Neighbor reports the dog is not vaccinated.     Triage Assessment     Row Name 07/18/23 6772       Triage Assessment (Adult)    Airway WDL WDL       Respiratory WDL    Respiratory WDL WDL       Skin Circulation/Temperature WDL    Skin Circulation/Temperature WDL WDL       Peripheral/Neurovascular WDL    Peripheral Neurovascular WDL WDL       Cognitive/Neuro/Behavioral WDL    Cognitive/Neuro/Behavioral WDL WDL

## (undated) DEVICE — SOL NACL 0.9% INJ 1000ML BAG 2B1324X

## (undated) DEVICE — ENDO TROCAR SLEEVE KII Z-THREADED 05X100MM CTS02

## (undated) DEVICE — ENDO POUCH UNIVERSAL RETRIEVAL SYSTEM INZII 12/15MM CD004

## (undated) DEVICE — SU VICRYL 0 UR-6 27" J603H

## (undated) DEVICE — SU VICRYL 4-0 PS-2 18" UND J496H

## (undated) DEVICE — BLADE CLIPPER 4406

## (undated) DEVICE — GOWN IMPERVIOUS SPECIALTY XLG/XLONG 32474

## (undated) DEVICE — SYSTEM CLEARIFY VISUALIZATION 21-345

## (undated) DEVICE — Device

## (undated) DEVICE — PACK LAP CHOLE SLC15LCFSD

## (undated) DEVICE — ENDO TROCAR FIRST ENTRY KII FIOS Z-THRD 12X100MM CTF73

## (undated) DEVICE — PREP CHLORAPREP 26ML TINTED ORANGE  260815

## (undated) DEVICE — LINEN TOWEL PACK X5 5464

## (undated) DEVICE — SOL WATER IRRIG 1000ML BOTTLE 2F7114

## (undated) DEVICE — STPL RELOAD REG TISSUE ECHELON 45 X 3.6MM BLUE GST45B

## (undated) DEVICE — GLOVE PROTEXIS W/NEU-THERA 8.0  2D73TE80

## (undated) DEVICE — ESU GROUND PAD UNIVERSAL W/O CORD

## (undated) DEVICE — NDL INSUFFLATION 13GA 120MM C2201

## (undated) DEVICE — SUCTION CANISTER MEDIVAC LINER 3000ML W/LID 65651-530

## (undated) DEVICE — ESU HOLDER LAP INST DISP PURPLE LONG 330MM H-PRO-330

## (undated) DEVICE — STPL POWERED ECHELON 45MM PSEE45A

## (undated) DEVICE — ENDO TROCAR FIRST ENTRY KII FIOS Z-THRD 05X100MM CTF03

## (undated) DEVICE — SUCTION IRR STRYKERFLOW II W/TIP 250-070-520

## (undated) RX ORDER — HYDROCODONE BITARTRATE AND ACETAMINOPHEN 5; 325 MG/1; MG/1
TABLET ORAL
Status: DISPENSED
Start: 2019-01-05

## (undated) RX ORDER — BUPIVACAINE HYDROCHLORIDE AND EPINEPHRINE 5; 5 MG/ML; UG/ML
INJECTION, SOLUTION EPIDURAL; INTRACAUDAL; PERINEURAL
Status: DISPENSED
Start: 2019-01-05

## (undated) RX ORDER — FENTANYL CITRATE 50 UG/ML
INJECTION, SOLUTION INTRAMUSCULAR; INTRAVENOUS
Status: DISPENSED
Start: 2019-01-05

## (undated) RX ORDER — DEXAMETHASONE SODIUM PHOSPHATE 4 MG/ML
INJECTION, SOLUTION INTRA-ARTICULAR; INTRALESIONAL; INTRAMUSCULAR; INTRAVENOUS; SOFT TISSUE
Status: DISPENSED
Start: 2019-01-05

## (undated) RX ORDER — NEOSTIGMINE METHYLSULFATE 1 MG/ML
VIAL (ML) INJECTION
Status: DISPENSED
Start: 2019-01-05

## (undated) RX ORDER — GLYCOPYRROLATE 0.2 MG/ML
INJECTION, SOLUTION INTRAMUSCULAR; INTRAVENOUS
Status: DISPENSED
Start: 2019-01-05

## (undated) RX ORDER — PROPOFOL 10 MG/ML
INJECTION, EMULSION INTRAVENOUS
Status: DISPENSED
Start: 2019-01-05

## (undated) RX ORDER — MEPERIDINE HYDROCHLORIDE 25 MG/ML
INJECTION INTRAMUSCULAR; INTRAVENOUS; SUBCUTANEOUS
Status: DISPENSED
Start: 2019-01-05

## (undated) RX ORDER — LIDOCAINE HYDROCHLORIDE 20 MG/ML
INJECTION, SOLUTION EPIDURAL; INFILTRATION; INTRACAUDAL; PERINEURAL
Status: DISPENSED
Start: 2019-01-05

## (undated) RX ORDER — LIDOCAINE HYDROCHLORIDE 10 MG/ML
INJECTION, SOLUTION EPIDURAL; INFILTRATION; INTRACAUDAL; PERINEURAL
Status: DISPENSED
Start: 2019-01-05

## (undated) RX ORDER — ONDANSETRON 2 MG/ML
INJECTION INTRAMUSCULAR; INTRAVENOUS
Status: DISPENSED
Start: 2019-01-05